# Patient Record
Sex: FEMALE | Race: WHITE | NOT HISPANIC OR LATINO | Employment: OTHER | URBAN - METROPOLITAN AREA
[De-identification: names, ages, dates, MRNs, and addresses within clinical notes are randomized per-mention and may not be internally consistent; named-entity substitution may affect disease eponyms.]

---

## 2017-02-03 RX ORDER — DILTIAZEM HYDROCHLORIDE 240 MG/1
CAPSULE, EXTENDED RELEASE ORAL
Qty: 30 CAPSULE | Refills: 4 | Status: SHIPPED | OUTPATIENT
Start: 2017-02-03 | End: 2017-05-01 | Stop reason: SDUPTHER

## 2017-04-11 ENCOUNTER — OFFICE VISIT (OUTPATIENT)
Dept: GASTROENTEROLOGY | Facility: CLINIC | Age: 72
End: 2017-04-11

## 2017-04-11 VITALS
SYSTOLIC BLOOD PRESSURE: 118 MMHG | HEIGHT: 66 IN | DIASTOLIC BLOOD PRESSURE: 72 MMHG | WEIGHT: 158.8 LBS | BODY MASS INDEX: 25.52 KG/M2

## 2017-04-11 DIAGNOSIS — Z83.71 FAMILY HISTORY OF POLYPS IN THE COLON: ICD-10-CM

## 2017-04-11 DIAGNOSIS — Z80.0 FAMILY HISTORY OF GI MALIGNANCY: ICD-10-CM

## 2017-04-11 DIAGNOSIS — K63.5 COLON POLYPS: ICD-10-CM

## 2017-04-11 DIAGNOSIS — K58.2 IRRITABLE BOWEL SYNDROME WITH BOTH CONSTIPATION AND DIARRHEA: ICD-10-CM

## 2017-04-11 DIAGNOSIS — K21.9 GASTROESOPHAGEAL REFLUX DISEASE, ESOPHAGITIS PRESENCE NOT SPECIFIED: ICD-10-CM

## 2017-04-11 DIAGNOSIS — R19.5 HEME POSITIVE STOOL: Primary | ICD-10-CM

## 2017-04-11 PROCEDURE — 99214 OFFICE O/P EST MOD 30 MIN: CPT | Performed by: INTERNAL MEDICINE

## 2017-04-11 RX ORDER — SODIUM CHLORIDE 0.9 % (FLUSH) 0.9 %
1-10 SYRINGE (ML) INJECTION AS NEEDED
Status: CANCELLED | OUTPATIENT
Start: 2017-04-11

## 2017-04-17 ENCOUNTER — TELEPHONE (OUTPATIENT)
Dept: GASTROENTEROLOGY | Facility: CLINIC | Age: 72
End: 2017-04-17

## 2017-04-17 NOTE — TELEPHONE ENCOUNTER
Call from pt.   She reports CT has been completed as ordered by Dr Geri Hensley.  Call to that office @ [phone] and spoke with Socorro.  Request CT results be faxed to [phone].

## 2017-04-17 NOTE — TELEPHONE ENCOUNTER
----- Message from Sam Downs sent at 4/17/2017  9:18 AM EDT -----  Regarding: PT CALLED  Contact: [phone]   PT IS CALLING TO GIVE RESULTS OF HER CT SCAN.

## 2017-04-24 ENCOUNTER — TELEPHONE (OUTPATIENT)
Dept: GASTROENTEROLOGY | Facility: CLINIC | Age: 72
End: 2017-04-24

## 2017-04-24 NOTE — TELEPHONE ENCOUNTER
With negative findings on CT scan, I would still offer both EGD and colonoscopy given her history of reflux, heme positive stools and suboptimal examination in the past.

## 2017-04-24 NOTE — TELEPHONE ENCOUNTER
----- Message from Angeli Cooley sent at 4/24/2017 10:05 AM EDT -----  Regarding: CT RESULTS  Contact: [phone]  PT CALLED FOR CT SCAN REPORT AND QUESTIONS ABOUT A SCOPE

## 2017-04-24 NOTE — TELEPHONE ENCOUNTER
Call to pt.  She is requesting CT results.  Advise per note that diverticulum seen, but no sign of diseas, and no action needs to be taken.  Otherwise, unremarkable.  Pt verb understanding and reports that has been placed on Bactrim for UTI.      Pt states that Dr Amezcua had scheduled egd/c/s, but wanted CT results prior to deciding to proceed.  State will be leaving for vacation 4/27, and not returning until just prior to scopes.  Asking if scopes still needed.  Message to Dr Amezcua.

## 2017-04-25 NOTE — TELEPHONE ENCOUNTER
Called pt and advised per Dr Amezcua that with negative findings on ct scan.  He still recommends egd and c/s given her history of reflux, heme positive stools and suboptimal examination in the past.  Pt verb understanding and states she will proceed with the scopes on 05/02/2017.

## 2017-04-26 ENCOUNTER — OFFICE VISIT (OUTPATIENT)
Dept: CARDIOLOGY | Facility: CLINIC | Age: 72
End: 2017-04-26

## 2017-04-26 VITALS
WEIGHT: 158 LBS | SYSTOLIC BLOOD PRESSURE: 124 MMHG | RESPIRATION RATE: 16 BRPM | HEIGHT: 66 IN | HEART RATE: 70 BPM | BODY MASS INDEX: 25.39 KG/M2 | DIASTOLIC BLOOD PRESSURE: 80 MMHG

## 2017-04-26 DIAGNOSIS — R07.2 PRECORDIAL PAIN: ICD-10-CM

## 2017-04-26 DIAGNOSIS — I48.0 PAROXYSMAL ATRIAL FIBRILLATION (HCC): Primary | ICD-10-CM

## 2017-04-26 PROCEDURE — 93000 ELECTROCARDIOGRAM COMPLETE: CPT | Performed by: INTERNAL MEDICINE

## 2017-04-26 PROCEDURE — 99214 OFFICE O/P EST MOD 30 MIN: CPT | Performed by: INTERNAL MEDICINE

## 2017-04-26 NOTE — PROGRESS NOTES
Date of Office Visit: 17  Encounter Provider: Deepak Connell MD  Place of Service: [facility] CARDIOLOGY  Patient Name: Rebeca De Souza  :1945      No chief complaint on file.    History of Present Illness  HPI Comments: The patient is a pleasant, 71-year-old female who has had an extensive cardiovascular  evaluation who had chest discomfort and multiple negative stress tests and then, in ,  had a cardiac catheterization at [facility] that was negative. She then presented  with three days of intermittent heart racing and chest pain and pressure. She presented  to [facility] on 2015, and was found to be in atrial fibrillation. She went back  into sinus rhythm on IV Cardizem and we started her on oral Cardizem. She had an  echocardiogram that showed normal left ventricular systolic function, normal saline  contrast study, and no significant valvular disease. She then wore a 30 day auto trigger atrial fibrillation event monitor.  She had no events.  She comes in for follow-up.   Denies near syncope or syncope.  Denies chest pain and pressure.  Denies orthopnea and PND.  Denies any stroke type symptoms.  She had one episode while her on vacation of some heart skipping but none of the racing like she had before.  Gets only one episode a year.  She had some dizziness around the time she had a urinary tract infection.  Overall she feels like heart wise she's doing great chest mother issues like she's can have an upper endoscopy and colonoscopy for GI reflux.  An indigestion.  She says things at home are good.    Atrial Fibrillation   Symptoms are negative for dizziness and weakness. Past medical history includes atrial fibrillation.         Past Medical History:   Diagnosis Date   • Atrial fibrillation     history on med   • Chest pain     history   • Fracture of radius     fall 16   • Frequent UTI     on prophylactic poab   • Hemorrhoid    •  Hypercalcemia    • IBS (irritable bowel syndrome)    • Parathyroid disorder     MD monitoring   • Wrist pain     left          Past Surgical History:   Procedure Laterality Date   • BLADDER SURGERY      tvt sling   • CARDIAC CATHETERIZATION     • COLONOSCOPY  10/23/2015    Non-thrombosed EH, Toruous colon, Diverticulosis, IH    • COLONOSCOPY  08/17/2010    Non-thrombosed EH, Diverticulosis, Tortuous colon, IH    • COLONOSCOPY  05/14/2007    Non thrombosed EH, Tortuous colon, One non bleeding polyp   • GALLBLADDER SURGERY     • HYSTERECTOMY      removed tubes & rt ovary also   • OOPHORECTOMY Left    • ORIF WRIST FRACTURE Left 12/30/2016    Procedure:  LT DISTAL RADIUS OPEN REDUCTION INTERNAL FIXATION;  Surgeon: Shawn Dorsey MD;  Location: [facility] OR;  Service:    • TONSILLECTOMY     • VARICOSE VEIN SURGERY      used wire         Current Outpatient Prescriptions on File Prior to Visit   Medication Sig Dispense Refill   • aspirin 81 MG chewable tablet Chew 81 mg daily.     • CARTIA  MG 24 hr capsule TAKE ONE CAPSULE BY MOUTH DAILY 30 capsule 4   • Cetirizine HCl 10 MG capsule Take 10 mg by mouth Daily.     • Cyanocobalamin (VITAMIN B-12) 500 MCG lozenge Take  by mouth.     • diltiaZEM CD (CARDIZEM CD) 240 MG 24 hr capsule Take 240 mg by mouth Every Morning.     • estradiol (VAGIFEM) 10 MCG tablet vaginal tablet Insert 1 tablet into the vagina 1 (One) Time Per Week.     • nitrofurantoin (MACRODANTIN) 50 MG capsule Take 50 mg by mouth Every Morning.     • [DISCONTINUED] mupirocin (BACTROBAN) 2 % nasal ointment 1 application into each nostril. USE AS DIRECTED PREOP     • [DISCONTINUED] ondansetron (ZOFRAN) 4 MG tablet Take 1 tablet by mouth Every 8 (Eight) Hours As Needed for nausea or vomiting. 30 tablet 0   • [DISCONTINUED] oxyCODONE-acetaminophen (PERCOCET) 5-325 MG per tablet Take 1-2 tablets by mouth Every 4 (Four) Hours As Needed (Pain). 56 tablet 0     No current facility-administered medications on file  prior to visit.          Social History     Social History   • Marital status:      Spouse name: N/A   • Number of children: N/A   • Years of education: N/A     Occupational History   • Not on file.     Social History Main Topics   • Smoking status: Never Smoker   • Smokeless tobacco: Never Used   • Alcohol use Yes      Comment: rarely   • Drug use: No      Comment: prescribed by MD   • Sexual activity: Defer     Other Topics Concern   • Not on file     Social History Narrative       Family History   Problem Relation Age of Onset   • Hypertension Mother    • Heart attack Father    • Hypertension Father    • Stroke Paternal Grandmother    • Pancreatic cancer Brother    • Colon cancer Maternal Uncle          Review of Systems   Constitution: Negative for decreased appetite, diaphoresis, fever, weakness, malaise/fatigue, weight gain and weight loss.   HENT: Negative for congestion, headaches, hearing loss, nosebleeds and tinnitus.    Eyes: Negative for blurred vision, double vision, vision loss in left eye, vision loss in right eye and visual disturbance.   Cardiovascular:        As noted in HPI   Respiratory:        As noted HPI   Endocrine: Negative for cold intolerance and heat intolerance.   Hematologic/Lymphatic: Negative for bleeding problem. Does not bruise/bleed easily.   Skin: Negative for color change, flushing, itching and rash.   Musculoskeletal: Negative for arthritis, back pain, joint pain, joint swelling, muscle weakness and myalgias.   Gastrointestinal: Positive for heartburn. Negative for bloating, abdominal pain, constipation, diarrhea, dysphagia, hematemesis, hematochezia, melena, nausea and vomiting.   Genitourinary: Negative for bladder incontinence, dysuria, frequency, nocturia and urgency.   Neurological: Negative for dizziness, focal weakness, light-headedness, loss of balance, numbness, paresthesias and vertigo.   Psychiatric/Behavioral: Negative for depression, memory loss and substance  "abuse.       Procedures      ECG 12 Lead  Date/Time: 4/26/2017 2:55 PM  Performed by: JAGRUTI MINA  Authorized by: JAGRUTI MINA   Comparison: compared with previous ECG   Similar to previous ECG  Rhythm: sinus rhythm  Rate: normal  Conduction: 1st degree  QRS axis: normal                 Objective:    /80 (BP Location: Right arm, Patient Position: Sitting, Cuff Size: Adult)  Pulse 70  Resp 16  Ht 66\" (167.6 cm)  Wt 158 lb (71.7 kg)  BMI 25.5 kg/m2       Physical Exam  Physical Exam   Constitutional: She is oriented to person, place, and time. She appears well-developed and well-nourished. No distress.   HENT:   Head: Normocephalic.   Eyes: Conjunctivae are normal. Pupils are equal, round, and reactive to light. No scleral icterus.   Neck: Normal carotid pulses, no hepatojugular reflux and no JVD present. Carotid bruit is not present. No tracheal deviation, no edema and no erythema present. No thyromegaly present.   Cardiovascular: Normal rate, regular rhythm, S1 normal, S2 normal, normal heart sounds and intact distal pulses.   No extrasystoles are present. PMI is not displaced.  Exam reveals no gallop, no distant heart sounds and no friction rub.    No murmur heard.  Pulses:       Carotid pulses are 2+ on the right side, and 2+ on the left side.       Radial pulses are 2+ on the right side, and 2+ on the left side.        Femoral pulses are 2+ on the right side, and 2+ on the left side.       Dorsalis pedis pulses are 2+ on the right side, and 2+ on the left side.        Posterior tibial pulses are 2+ on the right side, and 2+ on the left side.   Pulmonary/Chest: Effort normal and breath sounds normal. No respiratory distress. She has no decreased breath sounds. She has no wheezes. She has no rhonchi. She has no rales. She exhibits no tenderness.   Abdominal: Soft. Bowel sounds are normal. She exhibits no distension and no mass. There is no hepatosplenomegaly. There is no tenderness. There is " no rebound and no guarding.   Musculoskeletal: She exhibits no edema, tenderness or deformity.   Neurological: She is alert and oriented to person, place, and time.   Skin: Skin is warm and dry. No rash noted. She is not diaphoretic. No cyanosis or erythema. No pallor. Nails show no clubbing.   Psychiatric: She has a normal mood and affect. Her speech is normal and behavior is normal. Judgment and thought content normal.           Assessment:   1. This is a 71-year-old female with paroxysmal atrial fibrillation, [id number] score of two, which puts her at high risk of embolic event. Normal 30 day atrial fibrillation event monitor taken off anticoagulation.  Atrial Fibrillation and Atrial Flutter  Assessment  • The patient has paroxysmal atrial fibrillation  • This is non-valvular in etiology  • The patient's CHADS2-VASc score is 2  • A [id number]-LQTx score of 2 or more is considered a high risk for a thromboembolic event  • Aspirin prescribed    Plan  • Attempt to maintain sinus rhythm  • Continue aspirin for antithrombotic therapy, bleeding issues discussed  • Continue diltiazem for rhythm control  She's now doing well she is to continue the same will see us again in follow-up in one year.    2. History of chest pain. Negative cardiovascular workup.  In retrospect this is probably related to her arrhythmia.         Plan:

## 2017-05-02 ENCOUNTER — ANESTHESIA EVENT (OUTPATIENT)
Dept: GASTROENTEROLOGY | Facility: HOSPITAL | Age: 72
End: 2017-05-02

## 2017-05-02 ENCOUNTER — HOSPITAL ENCOUNTER (OUTPATIENT)
Facility: HOSPITAL | Age: 72
Setting detail: HOSPITAL OUTPATIENT SURGERY
Discharge: HOME | End: 2017-05-02
Attending: INTERNAL MEDICINE | Admitting: INTERNAL MEDICINE

## 2017-05-02 ENCOUNTER — ANESTHESIA (OUTPATIENT)
Dept: GASTROENTEROLOGY | Facility: HOSPITAL | Age: 72
End: 2017-05-02

## 2017-05-02 VITALS
RESPIRATION RATE: 20 BRPM | OXYGEN SATURATION: 98 % | HEART RATE: 69 BPM | SYSTOLIC BLOOD PRESSURE: 122 MMHG | TEMPERATURE: 98.3 F | DIASTOLIC BLOOD PRESSURE: 68 MMHG

## 2017-05-02 DIAGNOSIS — R19.5 HEME POSITIVE STOOL: ICD-10-CM

## 2017-05-02 DIAGNOSIS — Z83.71 FAMILY HISTORY OF POLYPS IN THE COLON: ICD-10-CM

## 2017-05-02 DIAGNOSIS — K21.9 GASTROESOPHAGEAL REFLUX DISEASE, ESOPHAGITIS PRESENCE NOT SPECIFIED: ICD-10-CM

## 2017-05-02 DIAGNOSIS — K63.5 COLON POLYPS: ICD-10-CM

## 2017-05-02 DIAGNOSIS — K58.2 IRRITABLE BOWEL SYNDROME WITH BOTH CONSTIPATION AND DIARRHEA: ICD-10-CM

## 2017-05-02 DIAGNOSIS — Z80.0 FAMILY HISTORY OF GI MALIGNANCY: ICD-10-CM

## 2017-05-02 PROCEDURE — 87081 CULTURE SCREEN ONLY: CPT | Performed by: INTERNAL MEDICINE

## 2017-05-02 PROCEDURE — 88312 SPECIAL STAINS GROUP 1: CPT | Performed by: INTERNAL MEDICINE

## 2017-05-02 PROCEDURE — 25010000002 PROPOFOL 10 MG/ML EMULSION: Performed by: ANESTHESIOLOGY

## 2017-05-02 PROCEDURE — 88305 TISSUE EXAM BY PATHOLOGIST: CPT | Performed by: INTERNAL MEDICINE

## 2017-05-02 PROCEDURE — 45380 COLONOSCOPY AND BIOPSY: CPT | Performed by: INTERNAL MEDICINE

## 2017-05-02 PROCEDURE — 43239 EGD BIOPSY SINGLE/MULTIPLE: CPT | Performed by: INTERNAL MEDICINE

## 2017-05-02 RX ORDER — SODIUM CHLORIDE, SODIUM LACTATE, POTASSIUM CHLORIDE, AND CALCIUM CHLORIDE .6; .31; .03; .02 G/100ML; G/100ML; G/100ML; G/100ML
20 INJECTION, SOLUTION INTRAVENOUS CONTINUOUS
Status: DISCONTINUED | OUTPATIENT
Start: 2017-05-02 | End: 2017-05-02 | Stop reason: HOSPADM

## 2017-05-02 RX ORDER — LIDOCAINE HYDROCHLORIDE 20 MG/ML
INJECTION, SOLUTION INFILTRATION; PERINEURAL AS NEEDED
Status: DISCONTINUED | OUTPATIENT
Start: 2017-05-02 | End: 2017-05-02 | Stop reason: SURG

## 2017-05-02 RX ORDER — SODIUM CHLORIDE 0.9 % (FLUSH) 0.9 %
1-10 SYRINGE (ML) INJECTION AS NEEDED
Status: DISCONTINUED | OUTPATIENT
Start: 2017-05-02 | End: 2017-05-02 | Stop reason: HOSPADM

## 2017-05-02 RX ORDER — PROPOFOL 10 MG/ML
VIAL (ML) INTRAVENOUS AS NEEDED
Status: DISCONTINUED | OUTPATIENT
Start: 2017-05-02 | End: 2017-05-02 | Stop reason: SURG

## 2017-05-02 RX ORDER — PROPOFOL 10 MG/ML
VIAL (ML) INTRAVENOUS CONTINUOUS PRN
Status: DISCONTINUED | OUTPATIENT
Start: 2017-05-02 | End: 2017-05-02 | Stop reason: SURG

## 2017-05-02 RX ADMIN — SODIUM CHLORIDE, SODIUM LACTATE, POTASSIUM CHLORIDE, AND CALCIUM CHLORIDE: 600; 310; 30; 20 INJECTION, SOLUTION INTRAVENOUS at 13:35

## 2017-05-02 RX ADMIN — PROPOFOL 300 MCG/KG/MIN: 10 INJECTION, EMULSION INTRAVENOUS at 13:40

## 2017-05-02 RX ADMIN — SODIUM CHLORIDE, SODIUM LACTATE, POTASSIUM CHLORIDE, AND CALCIUM CHLORIDE 700 ML: 600; 310; 30; 20 INJECTION, SOLUTION INTRAVENOUS at 14:21

## 2017-05-02 RX ADMIN — EPHEDRINE SULFATE 5 MG: 50 INJECTION INTRAMUSCULAR; INTRAVENOUS; SUBCUTANEOUS at 14:01

## 2017-05-02 RX ADMIN — PROPOFOL 100 MG: 10 INJECTION, EMULSION INTRAVENOUS at 13:40

## 2017-05-02 RX ADMIN — SODIUM CHLORIDE, SODIUM LACTATE, POTASSIUM CHLORIDE, AND CALCIUM CHLORIDE 20 ML/HR: 600; 310; 30; 20 INJECTION, SOLUTION INTRAVENOUS at 13:03

## 2017-05-02 RX ADMIN — LIDOCAINE HYDROCHLORIDE 40 MG: 20 INJECTION, SOLUTION INFILTRATION; PERINEURAL at 13:40

## 2017-05-03 LAB
CYTO UR: NORMAL
LAB AP CASE REPORT: NORMAL
Lab: NORMAL
PATH REPORT.FINAL DX SPEC: NORMAL
PATH REPORT.GROSS SPEC: NORMAL
UREASE TISS QL: NEGATIVE

## 2017-05-09 ENCOUNTER — TELEPHONE (OUTPATIENT)
Dept: GASTROENTEROLOGY | Facility: CLINIC | Age: 72
End: 2017-05-09

## 2017-05-09 RX ORDER — ESOMEPRAZOLE MAGNESIUM 40 MG/1
40 CAPSULE, DELAYED RELEASE ORAL DAILY
Qty: 30 CAPSULE | Refills: 11 | Status: SHIPPED | OUTPATIENT
Start: 2017-05-09 | End: 2021-10-26 | Stop reason: ALTCHOICE

## 2017-06-30 RX ORDER — DILTIAZEM HYDROCHLORIDE 240 MG/1
CAPSULE, EXTENDED RELEASE ORAL
Qty: 30 CAPSULE | Refills: 5 | Status: SHIPPED | OUTPATIENT
Start: 2017-06-30 | End: 2017-12-31 | Stop reason: SDUPTHER

## 2017-08-16 ENCOUNTER — TELEPHONE (OUTPATIENT)
Dept: GASTROENTEROLOGY | Facility: CLINIC | Age: 72
End: 2017-08-16

## 2017-08-16 NOTE — TELEPHONE ENCOUNTER
"Call from pt.  [facility] went to [facility] to  Esomeprazole and was advised that could not fill because of adjudication reject.  [facility] has received in prior months without problem.    Call to [facility] @ [phone] and spoke with Pharmacist, Jorge.  He states that \"plan limitations exceeded\".  Will require PA.  Has been faxed.  Will refax to [phone].  "

## 2017-08-17 ENCOUNTER — DOCUMENTATION (OUTPATIENT)
Dept: GASTROENTEROLOGY | Facility: CLINIC | Age: 72
End: 2017-08-17

## 2017-08-21 NOTE — TELEPHONE ENCOUNTER
PA approval for Nexium received for 6/18/16 - 8/17/18 (see Media Tab).      Call to pt to advise of above.  Verb understanding.

## 2018-01-02 RX ORDER — DILTIAZEM HYDROCHLORIDE 240 MG/1
CAPSULE, EXTENDED RELEASE ORAL
Qty: 30 CAPSULE | Refills: 4 | Status: SHIPPED | OUTPATIENT
Start: 2018-01-02 | End: 2018-04-26 | Stop reason: SDUPTHER

## 2018-04-26 ENCOUNTER — OFFICE VISIT (OUTPATIENT)
Dept: CARDIOLOGY | Facility: CLINIC | Age: 73
End: 2018-04-26

## 2018-04-26 VITALS
HEART RATE: 62 BPM | WEIGHT: 155 LBS | DIASTOLIC BLOOD PRESSURE: 78 MMHG | HEIGHT: 67 IN | BODY MASS INDEX: 24.33 KG/M2 | SYSTOLIC BLOOD PRESSURE: 128 MMHG

## 2018-04-26 DIAGNOSIS — I48.0 PAROXYSMAL ATRIAL FIBRILLATION (HCC): Primary | ICD-10-CM

## 2018-04-26 DIAGNOSIS — E78.2 MIXED HYPERLIPIDEMIA: ICD-10-CM

## 2018-04-26 PROCEDURE — 93000 ELECTROCARDIOGRAM COMPLETE: CPT | Performed by: INTERNAL MEDICINE

## 2018-04-26 PROCEDURE — 99214 OFFICE O/P EST MOD 30 MIN: CPT | Performed by: INTERNAL MEDICINE

## 2018-04-26 RX ORDER — DILTIAZEM HYDROCHLORIDE 240 MG/1
240 CAPSULE, COATED, EXTENDED RELEASE ORAL DAILY
Qty: 30 CAPSULE | Refills: 11 | Status: SHIPPED | OUTPATIENT
Start: 2018-04-26 | End: 2018-05-08

## 2018-04-26 NOTE — PROGRESS NOTES
Date of Office Visit: 18  Encounter Provider: Deepak Connell MD  Place of Service: [facility] CARDIOLOGY  Patient Name: Rebeca De Souza  :1945  Referral Provider:Geri Hensley MD      Chief Complaint   Patient presents with   • Atrial Fibrillation     History of Present Illness  The patient is a pleasant, 2-year-old female who has had an extensive cardiovascular  evaluation who had chest discomfort and multiple negative stress tests and then, in ,  had a cardiac catheterization at [facility] that was negative. She then presented  with three days of intermittent heart racing and chest pain and pressure. She presented  to [facility] on 2015, and was found to be in atrial fibrillation. She went back  into sinus rhythm on IV Cardizem and we started her on oral Cardizem. She had an  echocardiogram that showed normal left ventricular systolic function, normal saline  contrast study, and no significant valvular disease. She then wore a 30 day auto trigger atrial fibrillation event monitor.  She had no events.  She comes in for follow-up. The patient denies chest pain, pressure and heaviness. No shortness of breath, othopnea or PND.  She gets rare palpitations once or twice a month lasting just a few seconds without associated symptoms, no near syncope or syncope. No stroke type symptoms like paralysis, paresthesia, abrupt vision loss and dysarthria. No bleeding like blood in the stool or dark stools.   She had plantar fasciitis over the winter hadn't been walking but that's improving she was walking 2 miles a day.  Things at home are good.      Atrial Fibrillation   Symptoms are negative for dizziness and weakness. Past medical history includes atrial fibrillation.         Past Medical History:   Diagnosis Date   • Atrial fibrillation     history on med   • Blood in stool    • Chest pain     history   • Fracture of radius     fall 16   • Frequent  UTI     on prophylactic poab   • GERD (gastroesophageal reflux disease)    • Hemorrhoid    • Hypercalcemia    • IBS (irritable bowel syndrome)    • Parathyroid disorder     MD monitoring   • Wrist pain     left          Past Surgical History:   Procedure Laterality Date   • BLADDER SURGERY      tvt sling   • CARDIAC CATHETERIZATION     • COLONOSCOPY  10/23/2015    Non-thrombosed EH, Toruous colon, Diverticulosis, IH    • COLONOSCOPY  08/17/2010    Non-thrombosed EH, Diverticulosis, Tortuous colon, IH    • COLONOSCOPY  05/14/2007    Non thrombosed EH, Tortuous colon, One non bleeding polyp   • COLONOSCOPY N/A 5/2/2017    Procedure: COLONOSCOPY TO CECUM AND TI WITH COLD BIOPSIES POLYPECTOMY;  Surgeon: Deepak MARTINES MD;  Location: [facility] ENDOSCOPY;  Service:    • ENDOSCOPY N/A 5/2/2017    Procedure: ESOPHAGOGASTRODUODENOSCOPY  WITH COLD BIOPSY;  Surgeon: Deepak MARTINES MD;  Location: [facility] ENDOSCOPY;  Service:    • GALLBLADDER SURGERY     • HYSTERECTOMY      removed tubes & rt ovary also   • OOPHORECTOMY Left    • ORIF WRIST FRACTURE Left 12/30/2016    Procedure:  LT DISTAL RADIUS OPEN REDUCTION INTERNAL FIXATION;  Surgeon: Shawn Dorsey MD;  Location: [facility] MAIN OR;  Service:    • TONSILLECTOMY     • TONSILLECTOMY     • VARICOSE VEIN SURGERY      used wire         Current Outpatient Prescriptions on File Prior to Visit   Medication Sig Dispense Refill   • aspirin 81 MG chewable tablet Chew 81 mg daily.     • Cetirizine HCl 10 MG capsule Take 10 mg by mouth Daily.     • esomeprazole (nexIUM) 40 MG capsule Take 1 capsule by mouth Daily. 30 capsule 11   • estradiol (VAGIFEM) 10 MCG tablet vaginal tablet Insert 1 tablet into the vagina 1 (One) Time Per Week.     • nitrofurantoin (MACRODANTIN) 50 MG capsule Take 50 mg by mouth Every Morning.     • [DISCONTINUED] CARTIA  MG 24 hr capsule TAKE ONE CAPSULE BY MOUTH DAILY 30 capsule 4   • [DISCONTINUED] diltiaZEM CD (CARDIZEM CD) 240 MG 24 hr capsule Take  240 mg by mouth Every Morning.       No current facility-administered medications on file prior to visit.          Social History     Social History   • Marital status:      Spouse name: N/A   • Number of children: N/A   • Years of education: N/A     Occupational History   • Not on file.     Social History Main Topics   • Smoking status: Never Smoker   • Smokeless tobacco: Never Used   • Alcohol use Yes      Comment: rarely   • Drug use: No      Comment: prescribed by MD   • Sexual activity: Defer     Other Topics Concern   • Not on file     Social History Narrative   • No narrative on file       Family History   Problem Relation Age of Onset   • Hypertension Mother    • Heart attack Father    • Hypertension Father    • Stroke Paternal Grandmother    • Pancreatic cancer Brother    • Colon cancer Maternal Uncle          Review of Systems   Constitution: Negative for decreased appetite, diaphoresis, fever, weakness, malaise/fatigue, weight gain and weight loss.   HENT: Negative for congestion, hearing loss, nosebleeds and tinnitus.    Eyes: Negative for blurred vision, double vision, vision loss in left eye, vision loss in right eye and visual disturbance.   Cardiovascular:        As noted in HPI   Respiratory:        As noted HPI   Endocrine: Negative for cold intolerance and heat intolerance.   Hematologic/Lymphatic: Negative for bleeding problem. Does not bruise/bleed easily.   Skin: Negative for color change, flushing, itching and rash.   Musculoskeletal: Negative for arthritis, back pain, joint pain, joint swelling, muscle weakness and myalgias.   Gastrointestinal: Negative for bloating, abdominal pain, constipation, diarrhea, dysphagia, heartburn, hematemesis, hematochezia, melena, nausea and vomiting.   Genitourinary: Negative for bladder incontinence, dysuria, frequency, nocturia and urgency.   Neurological: Negative for dizziness, focal weakness, headaches, light-headedness, loss of balance, numbness,  "paresthesias and vertigo.   Psychiatric/Behavioral: Negative for depression, memory loss and substance abuse.       Procedures      ECG 12 Lead  Date/Time: 4/26/2018 2:11 PM  Performed by: JAGRUTI MINA  Authorized by: JAGRUTI MINA   Comparison: compared with previous ECG   Similar to previous ECG  Rhythm: sinus rhythm  Rate: normal  QRS axis: normal                  Objective:    /78 (BP Location: Right arm)   Pulse 62   Ht 168.9 cm (66.5\")   Wt 70.3 kg (155 lb)   BMI 24.64 kg/m²        Physical Exam  Physical Exam   Constitutional: She is oriented to person, place, and time. She appears well-developed and well-nourished. No distress.   HENT:   Head: Normocephalic.   Eyes: Conjunctivae are normal. Pupils are equal, round, and reactive to light. No scleral icterus.   Neck: Normal carotid pulses, no hepatojugular reflux and no JVD present. Carotid bruit is not present. No tracheal deviation, no edema and no erythema present. No thyromegaly present.   Cardiovascular: Normal rate, regular rhythm, S1 normal, S2 normal, normal heart sounds and intact distal pulses.   No extrasystoles are present. PMI is not displaced.  Exam reveals no gallop, no distant heart sounds and no friction rub.    No murmur heard.  Pulses:       Carotid pulses are 2+ on the right side, and 2+ on the left side.       Radial pulses are 2+ on the right side, and 2+ on the left side.        Femoral pulses are 2+ on the right side, and 2+ on the left side.       Dorsalis pedis pulses are 2+ on the right side, and 2+ on the left side.        Posterior tibial pulses are 2+ on the right side, and 2+ on the left side.   Pulmonary/Chest: Effort normal and breath sounds normal. No respiratory distress. She has no decreased breath sounds. She has no wheezes. She has no rhonchi. She has no rales. She exhibits no tenderness.   Abdominal: Soft. Bowel sounds are normal. She exhibits no distension and no mass. There is no hepatosplenomegaly. " There is no tenderness. There is no rebound and no guarding.   Musculoskeletal: She exhibits no edema, tenderness or deformity.   Neurological: She is alert and oriented to person, place, and time.   Skin: Skin is warm and dry. No rash noted. She is not diaphoretic. No cyanosis or erythema. No pallor. Nails show no clubbing.   Psychiatric: She has a normal mood and affect. Her speech is normal and behavior is normal. Judgment and thought content normal.           Assessment:   1. This is a 71-year-old female with paroxysmal atrial fibrillation, [id number] score of two, which puts her at high risk of embolic event. Normal 30 day atrial fibrillation event monitor taken off anticoagulation.  Atrial Fibrillation and Atrial Flutter  Assessment  • The patient has paroxysmal atrial fibrillation  • This is non-valvular in etiology  • The patient's CHADS2-VASc score is 2  • A [id number] score of 2 or more is considered a high risk for a thromboembolic event  • Aspirin prescribed    Plan  • Attempt to maintain sinus rhythm  • Continue aspirin for antithrombotic therapy, bleeding issues discussed  • Continue diltiazem for rhythm control  She's now doing well she is to continue the same will see us again in follow-up in one year.     2. History of chest pain. Negative cardiovascular workup.  In retrospect this is probably related to her arrhythmia.   3. Cardiac vascular risk assessment.  Based on her lipids her [employer] risk of stroke or heart attack in the next 10 years is 9.1% even at her age she would benefit from intermediate dose statin therapy.  Follow up with her PCP.         Plan:

## 2018-05-08 ENCOUNTER — HOSPITAL ENCOUNTER (EMERGENCY)
Facility: HOSPITAL | Age: 73
Discharge: HOME | End: 2018-05-08
Attending: EMERGENCY MEDICINE | Admitting: EMERGENCY MEDICINE

## 2018-05-08 ENCOUNTER — APPOINTMENT (OUTPATIENT)
Dept: GENERAL RADIOLOGY | Facility: HOSPITAL | Age: 73
End: 2018-05-08

## 2018-05-08 VITALS
HEART RATE: 62 BPM | TEMPERATURE: 96.5 F | SYSTOLIC BLOOD PRESSURE: 148 MMHG | WEIGHT: 151 LBS | RESPIRATION RATE: 18 BRPM | BODY MASS INDEX: 23.7 KG/M2 | DIASTOLIC BLOOD PRESSURE: 74 MMHG | HEIGHT: 67 IN | OXYGEN SATURATION: 98 %

## 2018-05-08 DIAGNOSIS — I48.0 PAROXYSMAL ATRIAL FIBRILLATION (HCC): Primary | ICD-10-CM

## 2018-05-08 LAB
ALBUMIN SERPL-MCNC: 4.5 G/DL (ref 3.5–5.2)
ALBUMIN/GLOB SERPL: 1.3 G/DL
ALP SERPL-CCNC: 114 U/L (ref 39–117)
ALT SERPL W P-5'-P-CCNC: 21 U/L (ref 1–33)
ANION GAP SERPL CALCULATED.3IONS-SCNC: 15.4 MMOL/L
AST SERPL-CCNC: 18 U/L (ref 1–32)
BASOPHILS # BLD AUTO: 0.07 10*3/MM3 (ref 0–0.2)
BASOPHILS NFR BLD AUTO: 1.1 % (ref 0–1.5)
BILIRUB SERPL-MCNC: 0.3 MG/DL (ref 0.1–1.2)
BUN BLD-MCNC: 17 MG/DL (ref 8–23)
BUN/CREAT SERPL: 21.8 (ref 7–25)
CALCIUM SPEC-SCNC: 9.8 MG/DL (ref 8.6–10.5)
CHLORIDE SERPL-SCNC: 104 MMOL/L (ref 98–107)
CO2 SERPL-SCNC: 24.6 MMOL/L (ref 22–29)
CREAT BLD-MCNC: 0.78 MG/DL (ref 0.57–1)
DEPRECATED RDW RBC AUTO: 45.4 FL (ref 37–54)
EOSINOPHIL # BLD AUTO: 0.25 10*3/MM3 (ref 0–0.7)
EOSINOPHIL NFR BLD AUTO: 3.9 % (ref 0.3–6.2)
ERYTHROCYTE [DISTWIDTH] IN BLOOD BY AUTOMATED COUNT: 12.5 % (ref 11.7–13)
GFR SERPL CREATININE-BSD FRML MDRD: 73 ML/MIN/1.73
GLOBULIN UR ELPH-MCNC: 3.4 GM/DL
GLUCOSE BLD-MCNC: 125 MG/DL (ref 65–99)
HCT VFR BLD AUTO: 43.1 % (ref 35.6–45.5)
HGB BLD-MCNC: 14.1 G/DL (ref 11.9–15.5)
IMM GRANULOCYTES # BLD: 0 10*3/MM3 (ref 0–0.03)
IMM GRANULOCYTES NFR BLD: 0 % (ref 0–0.5)
INR PPP: 0.96 (ref 0.9–1.1)
LYMPHOCYTES # BLD AUTO: 1.78 10*3/MM3 (ref 0.9–4.8)
LYMPHOCYTES NFR BLD AUTO: 27.5 % (ref 19.6–45.3)
MCH RBC QN AUTO: 32.3 PG (ref 26.9–32)
MCHC RBC AUTO-ENTMCNC: 32.7 G/DL (ref 32.4–36.3)
MCV RBC AUTO: 98.9 FL (ref 80.5–98.2)
MONOCYTES # BLD AUTO: 0.66 10*3/MM3 (ref 0.2–1.2)
MONOCYTES NFR BLD AUTO: 10.2 % (ref 5–12)
NEUTROPHILS # BLD AUTO: 3.71 10*3/MM3 (ref 1.9–8.1)
NEUTROPHILS NFR BLD AUTO: 57.3 % (ref 42.7–76)
PLATELET # BLD AUTO: 220 10*3/MM3 (ref 140–500)
PMV BLD AUTO: 10.9 FL (ref 6–12)
POTASSIUM BLD-SCNC: 3.5 MMOL/L (ref 3.5–5.2)
PROT SERPL-MCNC: 7.9 G/DL (ref 6–8.5)
PROTHROMBIN TIME: 12.6 SECONDS (ref 11.7–14.2)
RBC # BLD AUTO: 4.36 10*6/MM3 (ref 3.9–5.2)
SODIUM BLD-SCNC: 144 MMOL/L (ref 136–145)
TROPONIN T SERPL-MCNC: <0.01 NG/ML (ref 0–0.03)
WBC NRBC COR # BLD: 6.47 10*3/MM3 (ref 4.5–10.7)

## 2018-05-08 PROCEDURE — 85025 COMPLETE CBC W/AUTO DIFF WBC: CPT | Performed by: EMERGENCY MEDICINE

## 2018-05-08 PROCEDURE — 93010 ELECTROCARDIOGRAM REPORT: CPT | Performed by: INTERNAL MEDICINE

## 2018-05-08 PROCEDURE — 93005 ELECTROCARDIOGRAM TRACING: CPT | Performed by: EMERGENCY MEDICINE

## 2018-05-08 PROCEDURE — 96375 TX/PRO/DX INJ NEW DRUG ADDON: CPT

## 2018-05-08 PROCEDURE — 71045 X-RAY EXAM CHEST 1 VIEW: CPT

## 2018-05-08 PROCEDURE — 84484 ASSAY OF TROPONIN QUANT: CPT | Performed by: EMERGENCY MEDICINE

## 2018-05-08 PROCEDURE — 80053 COMPREHEN METABOLIC PANEL: CPT | Performed by: EMERGENCY MEDICINE

## 2018-05-08 PROCEDURE — 96374 THER/PROPH/DIAG INJ IV PUSH: CPT

## 2018-05-08 PROCEDURE — 99285 EMERGENCY DEPT VISIT HI MDM: CPT

## 2018-05-08 PROCEDURE — 85610 PROTHROMBIN TIME: CPT | Performed by: EMERGENCY MEDICINE

## 2018-05-08 PROCEDURE — 25010000002 ADENOSINE PER 6 MG: Performed by: EMERGENCY MEDICINE

## 2018-05-08 RX ORDER — SODIUM CHLORIDE 0.9 % (FLUSH) 0.9 %
10 SYRINGE (ML) INJECTION AS NEEDED
Status: DISCONTINUED | OUTPATIENT
Start: 2018-05-08 | End: 2018-05-08 | Stop reason: HOSPADM

## 2018-05-08 RX ORDER — ADENOSINE 3 MG/ML
6 INJECTION, SOLUTION INTRAVENOUS ONCE
Status: COMPLETED | OUTPATIENT
Start: 2018-05-08 | End: 2018-05-08

## 2018-05-08 RX ORDER — DILTIAZEM HYDROCHLORIDE 360 MG/1
360 CAPSULE, EXTENDED RELEASE ORAL DAILY
Qty: 30 CAPSULE | Refills: 0 | Status: SHIPPED | OUTPATIENT
Start: 2018-05-08 | End: 2018-05-14 | Stop reason: SDUPTHER

## 2018-05-08 RX ADMIN — APIXABAN 5 MG: 5 TABLET, FILM COATED ORAL at 07:50

## 2018-05-08 RX ADMIN — ADENOSINE 6 MG: 3 INJECTION, SOLUTION INTRAVENOUS at 06:19

## 2018-05-08 RX ADMIN — METOPROLOL TARTRATE 5 MG: 5 INJECTION, SOLUTION INTRAVENOUS at 06:23

## 2018-05-08 NOTE — ED TRIAGE NOTES
"Pt reports dizziness, lightheaded and weakness x 3 days. Pt also states \"my heart just started jumping around a little bit ago\". Pt hx afib.   "

## 2018-05-08 NOTE — ED PROVIDER NOTES
" EMERGENCY DEPARTMENT ENCOUNTER    CHIEF COMPLAINT  Chief Complaint: Palpitations   History given by: patient   History limited by: n/a  Room Number: 15/15  PMD: MD Dr Becki Dominguez, cardiology    HPI:  Pt is a 72 y.o. female who presents complaining of palpitations that began this morning. Pt describes the palpitations as her \"heart jumping around\". Pt reports a hx of atrial fibrillation. Pt also complains of dizziness that has been ongoing for the past 3 days and upper back pain.     Duration:  Prior to arrival  Onset: sudden  Timing: constant   Location: cardiac   Radiation: none   Quality: \"heart jumping around\"  Intensity/Severity: moderate  Progression: unchanged   Associated Symptoms: dizziness x3 days, u[pper back pain  Aggravating Factors: none  Alleviating Factors: none  Previous Episodes: hx of a-fib  Treatment before arrival: none    PAST MEDICAL HISTORY  Active Ambulatory Problems     Diagnosis Date Noted   • No Active Ambulatory Problems     Resolved Ambulatory Problems     Diagnosis Date Noted   • No Resolved Ambulatory Problems     Past Medical History:   Diagnosis Date   • Atrial fibrillation    • Blood in stool    • Chest pain    • Fracture of radius    • Frequent UTI    • GERD (gastroesophageal reflux disease)    • Hemorrhoid    • Hypercalcemia    • IBS (irritable bowel syndrome)    • Parathyroid disorder    • Wrist pain        PAST SURGICAL HISTORY  Past Surgical History:   Procedure Laterality Date   • BLADDER SURGERY      tvt sling   • CARDIAC CATHETERIZATION     • COLONOSCOPY  10/23/2015    Non-thrombosed EH, Toruous colon, Diverticulosis, IH    • COLONOSCOPY  08/17/2010    Non-thrombosed EH, Diverticulosis, Tortuous colon, IH    • COLONOSCOPY  05/14/2007    Non thrombosed EH, Tortuous colon, One non bleeding polyp   • COLONOSCOPY N/A 5/2/2017    Procedure: COLONOSCOPY TO CECUM AND TI WITH COLD BIOPSIES POLYPECTOMY;  Surgeon: Deepak MARTINES MD;  Location: [facility] ENDOSCOPY;  " Service:    • ENDOSCOPY N/A 5/2/2017    Procedure: ESOPHAGOGASTRODUODENOSCOPY  WITH COLD BIOPSY;  Surgeon: Deepak MARTINES MD;  Location: [facility] ENDOSCOPY;  Service:    • GALLBLADDER SURGERY     • HYSTERECTOMY      removed tubes & rt ovary also   • OOPHORECTOMY Left    • ORIF WRIST FRACTURE Left 12/30/2016    Procedure:  LT DISTAL RADIUS OPEN REDUCTION INTERNAL FIXATION;  Surgeon: Shawn Dorsey MD;  Location: [facility] MAIN OR;  Service:    • TONSILLECTOMY     • TONSILLECTOMY     • VARICOSE VEIN SURGERY      used wire       FAMILY HISTORY  Family History   Problem Relation Age of Onset   • Hypertension Mother    • Heart attack Father    • Hypertension Father    • Stroke Paternal Grandmother    • Pancreatic cancer Brother    • Colon cancer Maternal Uncle        SOCIAL HISTORY  Social History     Social History   • Marital status:      Spouse name: N/A   • Number of children: N/A   • Years of education: N/A     Occupational History   • Not on file.     Social History Main Topics   • Smoking status: Never Smoker   • Smokeless tobacco: Never Used   • Alcohol use Yes      Comment: rarely   • Drug use: No      Comment: prescribed by MD   • Sexual activity: Defer     Other Topics Concern   • Not on file     Social History Narrative   • No narrative on file       ALLERGIES  Shellfish-derived products; Shrimp (diagnostic); Other; and Penicillins    REVIEW OF SYSTEMS  Review of Systems   Constitutional: Negative for fever.   HENT: Negative for sore throat.    Eyes: Negative.    Respiratory: Negative for cough and shortness of breath.    Cardiovascular: Positive for palpitations. Negative for chest pain.   Gastrointestinal: Negative for abdominal pain, diarrhea and vomiting.   Genitourinary: Negative for dysuria.   Musculoskeletal: Positive for back pain. Negative for neck pain.   Skin: Negative for rash.   Allergic/Immunologic: Negative.    Neurological: Positive for dizziness. Negative for weakness, numbness and  headaches.   Hematological: Negative.    Psychiatric/Behavioral: Negative.    All other systems reviewed and are negative.      PHYSICAL EXAM  ED Triage Vitals   Temp Heart Rate Resp BP SpO2   05/08/18 0600 05/08/18 0600 05/08/18 0600 05/08/18 0613 05/08/18 0600   96.5 °F (35.8 °C) (!) 134 18 160/97 98 %      Temp src Heart Rate Source Patient Position BP Location FiO2 (%)   05/08/18 0600 05/08/18 0600 -- -- --   Tympanic Monitor          Physical Exam   Constitutional: She is oriented to person, place, and time and well-developed, well-nourished, and in no distress. No distress.   HENT:   Head: Normocephalic and atraumatic.   Eyes: EOM are normal. Pupils are equal, round, and reactive to light.   Neck: Normal range of motion. Neck supple.   Cardiovascular: Normal rate, regular rhythm and normal heart sounds.    Pulmonary/Chest: Effort normal and breath sounds normal. No respiratory distress.   Abdominal: Soft. There is no tenderness. There is no rebound and no guarding.   Musculoskeletal: Normal range of motion. She exhibits no edema.   Neurological: She is alert and oriented to person, place, and time.   Skin: Skin is warm and dry. No rash noted.   Psychiatric: Mood and affect normal.   Nursing note and vitals reviewed.      LAB RESULTS  Lab Results (last 24 hours)     Procedure Component Value Units Date/Time    CBC & Differential [[id number]] Collected:  05/08/18 0625    Specimen:  Blood Updated:  05/08/18 0654    Narrative:       The following orders were created for panel order CBC & Differential.  Procedure                               Abnormality         Status                     ---------                               -----------         ------                     CBC Auto Differential[[id number]]        Abnormal            Final result                 Please view results for these tests on the individual orders.    Comprehensive Metabolic Panel [[id number]]  (Abnormal) Collected:  05/08/18 0625    Specimen:   Blood Updated:  05/08/18 0704     Glucose 125 (H) mg/dL      BUN 17 mg/dL      Creatinine 0.78 mg/dL      Sodium 144 mmol/L      Potassium 3.5 mmol/L      Chloride 104 mmol/L      CO2 24.6 mmol/L      Calcium 9.8 mg/dL      Total Protein 7.9 g/dL      Albumin 4.50 g/dL      ALT (SGPT) 21 U/L      AST (SGOT) 18 U/L      Alkaline Phosphatase 114 U/L      Total Bilirubin 0.3 mg/dL      eGFR Non African Amer 73 mL/min/1.73      Globulin 3.4 gm/dL      A/G Ratio 1.3 g/dL      BUN/Creatinine Ratio 21.8     Anion Gap 15.4 mmol/L     Protime-INR [[id number]]  (Normal) Collected:  05/08/18 0625    Specimen:  Blood Updated:  05/08/18 0703     Protime 12.6 Seconds      INR 0.96    Troponin [[id number]]  (Normal) Collected:  05/08/18 0625    Specimen:  Blood Updated:  05/08/18 0703     Troponin T <0.010 ng/mL     Narrative:       Troponin T Reference Ranges:  Less than 0.03 ng/mL:    Negative for AMI  0.03 to 0.09 ng/mL:      Indeterminant for AMI  Greater than 0.09 ng/mL: Positive for AMI    CBC Auto Differential [[id number]]  (Abnormal) Collected:  05/08/18 0625    Specimen:  Blood Updated:  05/08/18 0654     WBC 6.47 10*3/mm3      RBC 4.36 10*6/mm3      Hemoglobin 14.1 g/dL      Hematocrit 43.1 %      MCV 98.9 (H) fL      MCH 32.3 (H) pg      MCHC 32.7 g/dL      RDW 12.5 %      RDW-SD 45.4 fl      MPV 10.9 fL      Platelets 220 10*3/mm3      Neutrophil % 57.3 %      Lymphocyte % 27.5 %      Monocyte % 10.2 %      Eosinophil % 3.9 %      Basophil % 1.1 %      Immature Grans % 0.0 %      Neutrophils, Absolute 3.71 10*3/mm3      Lymphocytes, Absolute 1.78 10*3/mm3      Monocytes, Absolute 0.66 10*3/mm3      Eosinophils, Absolute 0.25 10*3/mm3      Basophils, Absolute 0.07 10*3/mm3      Immature Grans, Absolute 0.00 10*3/mm3           I ordered the above labs and reviewed the results    RADIOLOGY  XR Chest 1 View          CXR shows NAD    I ordered the above noted radiological studies. Interpreted by radiologist. Reviewed by me in  PACS.       PROCEDURES  Critical Care  Performed by: JENNIFER BYERS  Authorized by: JENNIFER BYERS     Critical care provider statement:     Critical care time (minutes):  35    Critical care time was exclusive of:  Separately billable procedures and treating other patients    Critical care was necessary to treat or prevent imminent or life-threatening deterioration of the following conditions:  Circulatory failure    Critical care was time spent personally by me on the following activities:  Development of treatment plan with patient or surrogate, discussions with consultants, discussions with primary provider, evaluation of patient's response to treatment, examination of patient, ordering and performing treatments and interventions, ordering and review of laboratory studies, ordering and review of radiographic studies, pulse oximetry, re-evaluation of patient's condition and review of old charts        EKG           EKG time: 06:08  Rhythm/Rate: SVT vs A-flutter with 2:1 conduction 134  Interpreted Contemporaneously by me, independently viewed      PROGRESS AND CONSULTS  ED Course     06:16  BP- 160/97 HR- (!) 134 Temp- 96.5 °F (35.8 °C) (Tympanic) O2 sat- 98%  Advised pt of the plan to convert with Adenosine. Plan for labs and CXR. Pt understands and agrees with the plan, all questions answered.    06:19  6mg of Adenosine given. Flutter wave seen on monitor.  Metoprolol ordered.     06:21  CXR, CMP, CBC, PT/INR, and troponin ordered.    0730   Call placed to cardiology for consult     0735  Discussed pt's case with Dr Connell (cardiology), who follow patient in office    0755  Patient converted to simus rhythm on monitor. Will obtain repeat EKG.    Repeat EKG sinus rhythm, rate 59, normal axis, no acute ST changes, no conduction delays.    MEDICAL DECISION MAKING  Results were reviewed/discussed with the patient and they were also made aware of online access. Pt also made aware that some labs, such as cultures, will  not be resulted during ER visit and follow up with PMD is necessary.     MDM  Number of Diagnoses or Management Options  Paroxysmal atrial fibrillation:      Amount and/or Complexity of Data Reviewed  Clinical lab tests: ordered and reviewed (Troponin is <0.010)  Tests in the radiology section of CPT®: reviewed and ordered (CXR shows NAD)  Tests in the medicine section of CPT®: ordered and reviewed (See EKG procedure note. )  Discuss the patient with other providers: yes (Dr Connell (cardiology))    Critical Care  Total time providing critical care: 30-74 minutes    Patient Progress  Patient progress: stable         DIAGNOSIS  Final diagnoses:   Paroxysmal atrial fibrillation       DISPOSITION  DISCHARGED    Latest Documented Vital Signs:  As of 7:40 AM  BP- 128/93 HR- (!) 129 Temp- 96.5 °F (35.8 °C) (Tympanic) O2 sat- 96%    --  Documentation assistance provided by nohemi Slaughter for Dr Fry.  Information recorded by the scrib[employer]ina was done at my direction and has been verified and validated by me.        Elizabet Slaughter  05/08/18 0722       Onofre Fry MD  05/08/18 0768       Onofre Fry MD  05/08/18 0801       Onofre Fry MD  05/08/18 8089

## 2018-05-08 NOTE — ED NOTES
Pt states she's been feeling dizzy past two days, woke up this morning with heart palpitations and is complaining of back pain and weakness.     Francie Zaragoza, FELISA  05/08/18 9329

## 2018-05-13 ENCOUNTER — APPOINTMENT (OUTPATIENT)
Dept: CT IMAGING | Facility: HOSPITAL | Age: 73
End: 2018-05-13

## 2018-05-13 ENCOUNTER — HOSPITAL ENCOUNTER (EMERGENCY)
Facility: HOSPITAL | Age: 73
Discharge: HOME | End: 2018-05-13
Attending: EMERGENCY MEDICINE | Admitting: EMERGENCY MEDICINE

## 2018-05-13 VITALS
DIASTOLIC BLOOD PRESSURE: 62 MMHG | SYSTOLIC BLOOD PRESSURE: 127 MMHG | BODY MASS INDEX: 24.27 KG/M2 | OXYGEN SATURATION: 99 % | HEART RATE: 55 BPM | TEMPERATURE: 97.4 F | RESPIRATION RATE: 17 BRPM | WEIGHT: 151 LBS | HEIGHT: 66 IN

## 2018-05-13 DIAGNOSIS — R42 DIZZINESS: Primary | ICD-10-CM

## 2018-05-13 LAB
ALBUMIN SERPL-MCNC: 4.5 G/DL (ref 3.5–5.2)
ALBUMIN/GLOB SERPL: 1.3 G/DL
ALP SERPL-CCNC: 111 U/L (ref 39–117)
ALT SERPL W P-5'-P-CCNC: 21 U/L (ref 1–33)
ANION GAP SERPL CALCULATED.3IONS-SCNC: 13.5 MMOL/L
AST SERPL-CCNC: 18 U/L (ref 1–32)
BASOPHILS # BLD AUTO: 0.05 10*3/MM3 (ref 0–0.2)
BASOPHILS NFR BLD AUTO: 0.9 % (ref 0–1.5)
BILIRUB SERPL-MCNC: 0.4 MG/DL (ref 0.1–1.2)
BUN BLD-MCNC: 12 MG/DL (ref 8–23)
BUN/CREAT SERPL: 14.1 (ref 7–25)
CALCIUM SPEC-SCNC: 10.1 MG/DL (ref 8.6–10.5)
CHLORIDE SERPL-SCNC: 103 MMOL/L (ref 98–107)
CO2 SERPL-SCNC: 25.5 MMOL/L (ref 22–29)
CREAT BLD-MCNC: 0.85 MG/DL (ref 0.57–1)
DEPRECATED RDW RBC AUTO: 46.9 FL (ref 37–54)
EOSINOPHIL # BLD AUTO: 0.15 10*3/MM3 (ref 0–0.7)
EOSINOPHIL NFR BLD AUTO: 2.7 % (ref 0.3–6.2)
ERYTHROCYTE [DISTWIDTH] IN BLOOD BY AUTOMATED COUNT: 12.9 % (ref 11.7–13)
GFR SERPL CREATININE-BSD FRML MDRD: 66 ML/MIN/1.73
GLOBULIN UR ELPH-MCNC: 3.5 GM/DL
GLUCOSE BLD-MCNC: 107 MG/DL (ref 65–99)
HCT VFR BLD AUTO: 42.5 % (ref 35.6–45.5)
HGB BLD-MCNC: 13.7 G/DL (ref 11.9–15.5)
IMM GRANULOCYTES # BLD: 0.02 10*3/MM3 (ref 0–0.03)
IMM GRANULOCYTES NFR BLD: 0.4 % (ref 0–0.5)
LYMPHOCYTES # BLD AUTO: 1.53 10*3/MM3 (ref 0.9–4.8)
LYMPHOCYTES NFR BLD AUTO: 27.1 % (ref 19.6–45.3)
MCH RBC QN AUTO: 32.4 PG (ref 26.9–32)
MCHC RBC AUTO-ENTMCNC: 32.2 G/DL (ref 32.4–36.3)
MCV RBC AUTO: 100.5 FL (ref 80.5–98.2)
MONOCYTES # BLD AUTO: 0.54 10*3/MM3 (ref 0.2–1.2)
MONOCYTES NFR BLD AUTO: 9.6 % (ref 5–12)
NEUTROPHILS # BLD AUTO: 3.37 10*3/MM3 (ref 1.9–8.1)
NEUTROPHILS NFR BLD AUTO: 59.7 % (ref 42.7–76)
PLATELET # BLD AUTO: 238 10*3/MM3 (ref 140–500)
PMV BLD AUTO: 10.6 FL (ref 6–12)
POTASSIUM BLD-SCNC: 4 MMOL/L (ref 3.5–5.2)
PROT SERPL-MCNC: 8 G/DL (ref 6–8.5)
RBC # BLD AUTO: 4.23 10*6/MM3 (ref 3.9–5.2)
SODIUM BLD-SCNC: 142 MMOL/L (ref 136–145)
TROPONIN T SERPL-MCNC: <0.01 NG/ML (ref 0–0.03)
WBC NRBC COR # BLD: 5.64 10*3/MM3 (ref 4.5–10.7)

## 2018-05-13 PROCEDURE — 93005 ELECTROCARDIOGRAM TRACING: CPT | Performed by: EMERGENCY MEDICINE

## 2018-05-13 PROCEDURE — 93010 ELECTROCARDIOGRAM REPORT: CPT | Performed by: INTERNAL MEDICINE

## 2018-05-13 PROCEDURE — 25010000002 PROMETHAZINE PER 50 MG: Performed by: EMERGENCY MEDICINE

## 2018-05-13 PROCEDURE — 84484 ASSAY OF TROPONIN QUANT: CPT | Performed by: EMERGENCY MEDICINE

## 2018-05-13 PROCEDURE — 80053 COMPREHEN METABOLIC PANEL: CPT | Performed by: EMERGENCY MEDICINE

## 2018-05-13 PROCEDURE — 99284 EMERGENCY DEPT VISIT MOD MDM: CPT

## 2018-05-13 PROCEDURE — 96374 THER/PROPH/DIAG INJ IV PUSH: CPT

## 2018-05-13 PROCEDURE — 70450 CT HEAD/BRAIN W/O DYE: CPT

## 2018-05-13 PROCEDURE — 96361 HYDRATE IV INFUSION ADD-ON: CPT

## 2018-05-13 PROCEDURE — 85025 COMPLETE CBC W/AUTO DIFF WBC: CPT | Performed by: EMERGENCY MEDICINE

## 2018-05-13 RX ORDER — MECLIZINE HYDROCHLORIDE 25 MG/1
25 TABLET ORAL EVERY 6 HOURS PRN
Qty: 20 TABLET | Refills: 0 | Status: SHIPPED | OUTPATIENT
Start: 2018-05-13 | End: 2019-08-28

## 2018-05-13 RX ORDER — SODIUM CHLORIDE 0.9 % (FLUSH) 0.9 %
10 SYRINGE (ML) INJECTION AS NEEDED
Status: DISCONTINUED | OUTPATIENT
Start: 2018-05-13 | End: 2018-05-13 | Stop reason: HOSPADM

## 2018-05-13 RX ORDER — MECLIZINE HYDROCHLORIDE 25 MG/1
25 TABLET ORAL ONCE
Status: COMPLETED | OUTPATIENT
Start: 2018-05-13 | End: 2018-05-13

## 2018-05-13 RX ORDER — PROMETHAZINE HYDROCHLORIDE 25 MG/ML
6.25 INJECTION, SOLUTION INTRAMUSCULAR; INTRAVENOUS ONCE
Status: COMPLETED | OUTPATIENT
Start: 2018-05-13 | End: 2018-05-13

## 2018-05-13 RX ADMIN — MECLIZINE 25 MG: 25 TABLET ORAL at 11:45

## 2018-05-13 RX ADMIN — SODIUM CHLORIDE 1000 ML: 9 INJECTION, SOLUTION INTRAVENOUS at 10:26

## 2018-05-13 RX ADMIN — PROMETHAZINE HYDROCHLORIDE 6.25 MG: 25 INJECTION INTRAMUSCULAR; INTRAVENOUS at 10:24

## 2018-05-13 NOTE — ED PROVIDER NOTES
" EMERGENCY DEPARTMENT ENCOUNTER    CHIEF COMPLAINT  Chief Complaint: Light-headedness  History given by: Pt  History limited by: Nothing  Room Number: 36/36  PMD: Geri Hensley MD    HPI:  Pt is a 72 y.o. female who presents complaining of light-headedness onset about six days ago. Pt also complains of dizziness worsened with laying flat and nausea but denies ear ache, nausea, palpitations, SOA, headache, or any other symptoms at this time. Pt reports similar symptoms which was resolved with antivert at that time. She states she has an appointment with Dr. Connell (cardiologist) tomorrow.    Duration:  About six days  Onset: gradual  Timing: constant  Location: n/a  Radiation: none  Quality: \"light-headedness\"  Intensity/Severity: moderate  Progression: unchanged  Associated Symptoms:  dizziness worsened with laying flat and nausea   Aggravating Factors: none  Alleviating Factors: none  Previous Episodes: Pt reports hx of vertigo.  Treatment before arrival: Pt received no treatment PTA.    PAST MEDICAL HISTORY  Active Ambulatory Problems     Diagnosis Date Noted   • No Active Ambulatory Problems     Resolved Ambulatory Problems     Diagnosis Date Noted   • No Resolved Ambulatory Problems     Past Medical History:   Diagnosis Date   • Atrial fibrillation    • Blood in stool    • Chest pain    • Fracture of radius    • Frequent UTI    • GERD (gastroesophageal reflux disease)    • Hemorrhoid    • Hypercalcemia    • IBS (irritable bowel syndrome)    • Parathyroid disorder    • Wrist pain        PAST SURGICAL HISTORY  Past Surgical History:   Procedure Laterality Date   • BLADDER SURGERY      tvt sling   • CARDIAC CATHETERIZATION     • COLONOSCOPY  10/23/2015    Non-thrombosed EH, Toruous colon, Diverticulosis, IH    • COLONOSCOPY  08/17/2010    Non-thrombosed EH, Diverticulosis, Tortuous colon, IH    • COLONOSCOPY  05/14/2007    Non thrombosed EH, Tortuous colon, One non bleeding polyp   • COLONOSCOPY N/A 5/2/2017 "    Procedure: COLONOSCOPY TO CECUM AND TI WITH COLD BIOPSIES POLYPECTOMY;  Surgeon: Deepak MARTINES MD;  Location: [facility] ENDOSCOPY;  Service:    • ENDOSCOPY N/A 5/2/2017    Procedure: ESOPHAGOGASTRODUODENOSCOPY  WITH COLD BIOPSY;  Surgeon: Deepak MARTINES MD;  Location: [facility] ENDOSCOPY;  Service:    • GALLBLADDER SURGERY     • HYSTERECTOMY      removed tubes & rt ovary also   • OOPHORECTOMY Left    • ORIF WRIST FRACTURE Left 12/30/2016    Procedure:  LT DISTAL RADIUS OPEN REDUCTION INTERNAL FIXATION;  Surgeon: Shawn Dorsey MD;  Location: [facility] MAIN OR;  Service:    • TONSILLECTOMY     • TONSILLECTOMY     • VARICOSE VEIN SURGERY      used wire       FAMILY HISTORY  Family History   Problem Relation Age of Onset   • Hypertension Mother    • Heart attack Father    • Hypertension Father    • Stroke Paternal Grandmother    • Pancreatic cancer Brother    • Colon cancer Maternal Uncle        SOCIAL HISTORY  Social History     Social History   • Marital status:      Spouse name: N/A   • Number of children: N/A   • Years of education: N/A     Occupational History   • Not on file.     Social History Main Topics   • Smoking status: Never Smoker   • Smokeless tobacco: Never Used   • Alcohol use Yes      Comment: rarely   • Drug use: No      Comment: prescribed by MD   • Sexual activity: Defer     Other Topics Concern   • Not on file     Social History Narrative   • No narrative on file       ALLERGIES  Shellfish-derived products; Shrimp (diagnostic); Other; and Penicillins    REVIEW OF SYSTEMS  Review of Systems   Constitutional: Negative for fever.   HENT: Negative for sore throat.    Eyes: Negative.    Respiratory: Negative for cough and shortness of breath.    Cardiovascular: Negative for chest pain.   Gastrointestinal: Positive for nausea. Negative for abdominal pain, diarrhea and vomiting.   Genitourinary: Negative for dysuria.   Musculoskeletal: Negative for neck pain.   Skin: Negative for rash.    Allergic/Immunologic: Negative.    Neurological: Positive for dizziness (worsened with laying flat) and light-headedness. Negative for weakness, numbness and headaches.   Hematological: Negative.    Psychiatric/Behavioral: Negative.    All other systems reviewed and are negative.      PHYSICAL EXAM  ED Triage Vitals [05/13/18 1008]   Temp Heart Rate Resp BP SpO2   96.9 °F (36.1 °C) 88 16 -- 97 %      Temp src Heart Rate Source Patient Position BP Location FiO2 (%)   Tympanic -- -- -- --       Physical Exam   Constitutional: She is oriented to person, place, and time and well-developed, well-nourished, and in no distress. No distress.   HENT:   Head: Normocephalic and atraumatic.   Right Ear: Tympanic membrane normal.   Left Ear: Tympanic membrane normal.   Eyes: EOM are normal. Pupils are equal, round, and reactive to light.   Neck: Normal range of motion. Neck supple. Carotid bruit is not present.   Cardiovascular: Normal rate, regular rhythm and normal heart sounds.    Pulmonary/Chest: Effort normal and breath sounds normal. No respiratory distress.   Abdominal: Soft. There is no tenderness. There is no rebound and no guarding.   Musculoskeletal: Normal range of motion. She exhibits no edema.   Neurological: She is alert and oriented to person, place, and time. She has normal sensation and normal strength.   No nystagmus. Negative finger to nose and heel to shin.   Skin: Skin is warm and dry. No rash noted.   Psychiatric: Mood and affect normal.   Nursing note and vitals reviewed.      LAB RESULTS  Lab Results (last 24 hours)     Procedure Component Value Units Date/Time    CBC & Differential [[id number]] Collected:  05/13/18 1027    Specimen:  Blood Updated:  05/13/18 1039    Narrative:       The following orders were created for panel order CBC & Differential.  Procedure                               Abnormality         Status                     ---------                               -----------          ------                     CBC Auto Differential[[id number]]        Abnormal            Final result                 Please view results for these tests on the individual orders.    Comprehensive Metabolic Panel [[id number]]  (Abnormal) Collected:  05/13/18 1027    Specimen:  Blood Updated:  05/13/18 1103     Glucose 107 (H) mg/dL      BUN 12 mg/dL      Creatinine 0.85 mg/dL      Sodium 142 mmol/L      Potassium 4.0 mmol/L      Chloride 103 mmol/L      CO2 25.5 mmol/L      Calcium 10.1 mg/dL      Total Protein 8.0 g/dL      Albumin 4.50 g/dL      ALT (SGPT) 21 U/L      AST (SGOT) 18 U/L      Alkaline Phosphatase 111 U/L      Total Bilirubin 0.4 mg/dL      eGFR Non African Amer 66 mL/min/1.73      Globulin 3.5 gm/dL      A/G Ratio 1.3 g/dL      BUN/Creatinine Ratio 14.1     Anion Gap 13.5 mmol/L     Troponin [[id number]]  (Normal) Collected:  05/13/18 1027    Specimen:  Blood Updated:  05/13/18 1105     Troponin T <0.010 ng/mL     Narrative:       Troponin T Reference Ranges:  Less than 0.03 ng/mL:    Negative for AMI  0.03 to 0.09 ng/mL:      Indeterminant for AMI  Greater than 0.09 ng/mL: Positive for AMI    CBC Auto Differential [[id number]]  (Abnormal) Collected:  05/13/18 1027    Specimen:  Blood Updated:  05/13/18 1039     WBC 5.64 10*3/mm3      RBC 4.23 10*6/mm3      Hemoglobin 13.7 g/dL      Hematocrit 42.5 %      .5 (H) fL      MCH 32.4 (H) pg      MCHC 32.2 (L) g/dL      RDW 12.9 %      RDW-SD 46.9 fl      MPV 10.6 fL      Platelets 238 10*3/mm3      Neutrophil % 59.7 %      Lymphocyte % 27.1 %      Monocyte % 9.6 %      Eosinophil % 2.7 %      Basophil % 0.9 %      Immature Grans % 0.4 %      Neutrophils, Absolute 3.37 10*3/mm3      Lymphocytes, Absolute 1.53 10*3/mm3      Monocytes, Absolute 0.54 10*3/mm3      Eosinophils, Absolute 0.15 10*3/mm3      Basophils, Absolute 0.05 10*3/mm3      Immature Grans, Absolute 0.02 10*3/mm3           I ordered the above labs and reviewed the results    RADIOLOGY  CT  Head Without Contrast   Final Result           No acute intracranial hemorrhage or hydrocephalus. If there is   further clinical concern, MRI could be considered for further   evaluation.       This report was finalized on 5/13/2018 11:06 AM by Dr. Alexander Cavazos M.D.               I ordered the above noted radiological studies. Interpreted by radiologist. Reviewed by me in PACS.       PROCEDURES  Procedures    EKG           EKG time: 1041  Rhythm/Rate: NSR, 65  P waves and RI: LAE  QRS, axis: nml  ST and T waves: nonspecific T wave change anteriorly     Interpreted Contemporaneously by me, independently viewed  Unchanged compared to prior 5/8/2018    PROGRESS AND CONSULTS  ED Course     1021 Ordered CBC, CMP, Troponin, and CT Head for further evaluation. Ordered phenergan for treatment of symptoms.     1122 Ordered antivert for treatment of symptoms    1259 BP- 127/62 HR- 53 Temp- 96.9 °F (36.1 °C) (Tympanic) O2 sat- 100%. Rechecked the patient who is in NAD and is resting comfortably. She has ambulated without difficulty in the Ed. Discussed negative lab and imaging results with pt. Discussed with pt her symptoms are most likely secondary to vertigo. Suggested pt not take any sudden movements. Suggested pt follow up with Dr. Connell as scheduled. Pt understands and agrees with the plan. All questions answered.    MEDICAL DECISION MAKING  Results were reviewed/discussed with the patient and they were also made aware of online access. Pt also made aware that some labs, such as cultures, will not be resulted during ER visit and follow up with PMD is necessary.     MDM  Number of Diagnoses or Management Options  Dizziness:   Diagnosis management comments: Patient had a normal neuro exam.  Head CT, EKG, and labs were unremarkable.  Patient was given IV fluids, IV Phenergan, and oral meclizine.  She was able to ambulate in the ER without assistance.  She will be discharged with a prescription for meclizine and  advised to follow-up with her primary care doctor in the next several days if symptoms are not improving.       Amount and/or Complexity of Data Reviewed  Clinical lab tests: ordered and reviewed (WBC - 5.64, Troponin - <.010)  Tests in the radiology section of CPT®: ordered and reviewed  Tests in the medicine section of CPT®: ordered and reviewed (See EKG procedure note.)  Decide to obtain previous medical records or to obtain history from someone other than the patient: yes  Review and summarize past medical records: yes (Pt was seen in the ED five days ago for paroxysmal A-Fib and dizziness. She was given adenosine and IV metoprolol. She converted to NSR at that time.)    Patient Progress  Patient progress: stable         DIAGNOSIS  Final diagnoses:   Dizziness       DISPOSITION  DISCHARGE    Patient discharged in stable condition.    Reviewed implications of results, diagnosis, meds, responsibility to follow up, warning signs and symptoms of possible worsening, potential complications and reasons to return to ER, including new or worsening symptoms.    Patient/Family voiced understanding of above instructions.    Discussed plan for discharge, as there is no emergent indication for admission. Patient referred to primary care provider for BP management due to today's BP. Pt/family is agreeable and understands need for follow up and repeat testing.  Pt is aware that discharge does not mean that nothing is wrong but it indicates no emergency is present that requires admission and they must continue care with follow-up as given below or physician of their choice.     FOLLOW-UP  Grei Hensley MD  [address] [address] [facility] [place] [address] [phone]    Call in 2 days  If symptoms persist         Medication List      New Prescriptions    meclizine 25 MG tablet  Commonly known as:  ANTIVERT  Take 1 tablet by mouth Every 6 (Six) Hours As Needed for dizziness.              Latest Documented Vital Signs:  As of 3:12  PM  BP- 127/62 HR- 55 Temp- 97.4 °F (36.3 °C) (Tympanic) O2 sat- 99%    --  Documentation assistance provided by nohemi Lyles for Dr. Schumacher.  Information recorded by the [facility]ir[facility] was done at my direction and has been verified and validated by me.     Minesh Lyles  05/13/18 1322       Marvel Schumacher MD  05/13/18 5710

## 2018-05-14 ENCOUNTER — OFFICE VISIT (OUTPATIENT)
Dept: CARDIOLOGY | Facility: CLINIC | Age: 73
End: 2018-05-14

## 2018-05-14 VITALS
BODY MASS INDEX: 24.78 KG/M2 | SYSTOLIC BLOOD PRESSURE: 138 MMHG | HEIGHT: 66 IN | DIASTOLIC BLOOD PRESSURE: 84 MMHG | HEART RATE: 63 BPM | WEIGHT: 154.2 LBS

## 2018-05-14 DIAGNOSIS — I48.0 PAROXYSMAL ATRIAL FIBRILLATION (HCC): Primary | ICD-10-CM

## 2018-05-14 DIAGNOSIS — R42 VERTIGO: ICD-10-CM

## 2018-05-14 PROCEDURE — 99213 OFFICE O/P EST LOW 20 MIN: CPT | Performed by: NURSE PRACTITIONER

## 2018-05-14 PROCEDURE — 93000 ELECTROCARDIOGRAM COMPLETE: CPT | Performed by: NURSE PRACTITIONER

## 2018-05-14 RX ORDER — DILTIAZEM HYDROCHLORIDE 360 MG/1
360 CAPSULE, EXTENDED RELEASE ORAL DAILY
Qty: 30 CAPSULE | Refills: 5 | Status: SHIPPED | OUTPATIENT
Start: 2018-05-14 | End: 2018-05-14 | Stop reason: SDUPTHER

## 2018-05-14 RX ORDER — DILTIAZEM HYDROCHLORIDE 360 MG/1
360 CAPSULE, EXTENDED RELEASE ORAL DAILY
Qty: 30 CAPSULE | Refills: 5 | Status: SHIPPED | OUTPATIENT
Start: 2018-05-14 | End: 2018-12-03 | Stop reason: SDUPTHER

## 2018-05-14 RX ORDER — FLUTICASONE PROPIONATE 50 MCG
1 SPRAY, SUSPENSION (ML) NASAL DAILY
COMMUNITY
End: 2018-08-24

## 2018-05-14 NOTE — PROGRESS NOTES
Date of Office Visit: 2018  Encounter Provider: ORACIO Abrams  Place of Service: [facility] CARDIOLOGY  Patient Name: Rebeca De Souza  :1945    Chief Complaint   Patient presents with   • Atrial Fibrillation   :     HPI: Rebeca De Souza is a 72 y.o. female comes in today for follow up from emergency room for atrial fibrillation.    She is a patient of Dr. Connell and I'm seeing her for the first time today.  I reviewed her records.    In the past, she's had multiple cardiovascular evaluations.  She had multiple negative stress test in the past.  In , due to continued chest pain, she had a negative cardiac catheterization performed at [facility].    In 2015, she was found to be in A. fib.  She went into sinus rhythm on IV Cardizem.  An echo showed normal LV function with no significant valve disease.  She then or 30 day event monitor after that with no events.  She was taken off anticoagulation.    May 8, 2018, she came to the ER complaining of palpitations.  Heart rate was 134.  EKG showed SVT versus a flutter.  Adenosine was given ×2 as well as IV metoprolol.  She converted to sinus rhythm with repeat EKG at a rate of 59.  He was started on Eliquis 5 mg twice a day.    May 13, 2018, she came in with complaining of lightheadedness that had continued from 6 days prior.  Her EKG showed normal sinus rhythm.  Antivert was ordered.    Today, she comes in for follow-up.  2 days prior to her heart starting to palpate, she developed dizziness.  Worse when laying down or turning her head.  Feels like room spinning.  This is been going on for about 7 days now.  She's had this in the past and was given antivertigo.  Since going to the emergency room, she has not had any problems with heart palpitations.  When she feels herself go into A. fib, she can feel her heart really jumping around.  She was unable to tell if she had dizziness because she was  having these other symptoms.  She says she does have chest pain with it.  Denies shortness of breath.  Denies any syncope or presyncope.    Past Medical History:   Diagnosis Date   • Atrial fibrillation     history on med   • Blood in stool    • Chest pain     history   • Fracture of radius     fall 12/27/16   • Frequent UTI     on prophylactic poab   • GERD (gastroesophageal reflux disease)    • Hemorrhoid    • Hypercalcemia    • IBS (irritable bowel syndrome)    • Parathyroid disorder     MD monitoring   • Wrist pain     left        Past Surgical History:   Procedure Laterality Date   • BLADDER SURGERY      tvt sling   • CARDIAC CATHETERIZATION     • COLONOSCOPY  10/23/2015    Non-thrombosed EH, Toruous colon, Diverticulosis, IH    • COLONOSCOPY  08/17/2010    Non-thrombosed EH, Diverticulosis, Tortuous colon, IH    • COLONOSCOPY  05/14/2007    Non thrombosed EH, Tortuous colon, One non bleeding polyp   • COLONOSCOPY N/A 5/2/2017    Procedure: COLONOSCOPY TO CECUM AND TI WITH COLD BIOPSIES POLYPECTOMY;  Surgeon: Deepak MARTINES MD;  Location: [facility] ENDOSCOPY;  Service:    • ENDOSCOPY N/A 5/2/2017    Procedure: ESOPHAGOGASTRODUODENOSCOPY  WITH COLD BIOPSY;  Surgeon: Deepak MARTINES MD;  Location: [facility] ENDOSCOPY;  Service:    • GALLBLADDER SURGERY     • HYSTERECTOMY      removed tubes & rt ovary also   • OOPHORECTOMY Left    • ORIF WRIST FRACTURE Left 12/30/2016    Procedure:  LT DISTAL RADIUS OPEN REDUCTION INTERNAL FIXATION;  Surgeon: Shawn Dorsey MD;  Location: [facility] MAIN OR;  Service:    • TONSILLECTOMY     • TONSILLECTOMY     • VARICOSE VEIN SURGERY      used wire           Review of Systems   Cardiovascular: Positive for palpitations.   Neurological: Positive for dizziness.     All other systems reviewed and are negative    Allergies   Allergen Reactions   • Shellfish-Derived Products Swelling     Mouth swelled   • Shrimp (Diagnostic) Swelling   • Other Itching and Rash     Cats ringworm    •  "Penicillins Rash     childhood       All aspects of family and social history reviewed.           Objective:     Vitals:    05/14/18 0841   BP: 138/84   BP Location: Left arm   Pulse: 63   Weight: 69.9 kg (154 lb 3.2 oz)   Height: 167.6 cm (66\")     Body mass index is 24.89 kg/m².    PHYSICAL EXAM:  Physical Exam   Constitutional: She is oriented to person, place, and time. She appears well-developed and well-nourished. No distress.   HENT:   Head: Normocephalic.   Eyes: Conjunctivae are normal. Pupils are equal, round, and reactive to light. No scleral icterus.   Neck: Normal carotid pulses, no hepatojugular reflux and no JVD present. Carotid bruit is not present. No tracheal deviation, no edema and no erythema present. No thyromegaly present.   Cardiovascular: Normal rate, regular rhythm, S1 normal, S2 normal, normal heart sounds and intact distal pulses.   No extrasystoles are present. PMI is not displaced.  Exam reveals no gallop, no distant heart sounds and no friction rub.    No murmur heard.  Pulses:       Carotid pulses are 2+ on the right side, and 2+ on the left side.       Radial pulses are 2+ on the right side, and 2+ on the left side.        Femoral pulses are 2+ on the right side, and 2+ on the left side.       Dorsalis pedis pulses are 2+ on the right side, and 2+ on the left side.        Posterior tibial pulses are 2+ on the right side, and 2+ on the left side.   Pulmonary/Chest: Effort normal and breath sounds normal. No respiratory distress. She has no decreased breath sounds. She has no wheezes. She has no rhonchi. She has no rales. She exhibits no tenderness.   Abdominal: Soft. Bowel sounds are normal. She exhibits no distension and no mass. There is no hepatosplenomegaly. There is no tenderness. There is no rebound and no guarding.   Musculoskeletal: She exhibits no edema, tenderness or deformity.   Neurological: She is alert and oriented to person, place, and time.   Skin: Skin is warm and dry. " No rash noted. She is not diaphoretic. No cyanosis or erythema. No pallor. Nails show no clubbing.   Psychiatric: She has a normal mood and affect. Her speech is normal and behavior is normal. Judgment and thought content normal.         ECG 12 Lead  Date/Time: 5/14/2018 9:12 AM  Performed by: SHERRY CARDENAS  Authorized by: SHERRY CARDENAS   Comparison: compared with previous ECG from 5/8/2018  Rhythm: sinus rhythm  Rate: normal  BPM: 63  Conduction: conduction normal  ST Segments: ST segments normal  ST Flattening: V2 and V3  QRS axis: normal  Clinical impression: abnormal ECG  Comments: Indication: afib                Assessment:       Diagnosis Plan   1. Paroxysmal atrial fibrillation     2. Vertigo          Orders Placed This Encounter   Procedures   • ECG 12 Lead     This order was created via procedure documentation       Current Outpatient Prescriptions   Medication Sig Dispense Refill   • diltiaZEM CD (CARDIZEM CD) 360 MG 24 hr capsule Take 1 capsule by mouth Daily. 30 capsule 5   • esomeprazole (nexIUM) 40 MG capsule Take 1 capsule by mouth Daily. (Patient taking differently: Take 40 mg by mouth Daily As Needed.) 30 capsule 11   • estradiol (VAGIFEM) 10 MCG tablet vaginal tablet Insert 1 tablet into the vagina 1 (One) Time Per Week.     • fluticasone (FLONASE) 50 MCG/ACT nasal spray 1 spray into each nostril Daily.     • meclizine (ANTIVERT) 25 MG tablet Take 1 tablet by mouth Every 6 (Six) Hours As Needed for dizziness. 20 tablet 0   • nitrofurantoin (MACRODANTIN) 50 MG capsule Take 50 mg by mouth Every Morning.     • metoprolol tartrate (LOPRESSOR) 25 MG tablet Take up to twice daily as needed for palpitations. 60 tablet 5   • rivaroxaban (XARELTO) 20 MG tablet Take 1 tablet by mouth Daily. 30 tablet 5     No current facility-administered medications for this visit.             Plan:       1. Atrial Fibrillation and Atrial Flutter  Assessment  • The patient has paroxysmal atrial fibrillation  • This  is non-valvular in etiology  • The patient's CHADS2-VASc score is 2  • A [id number] score of 2 or more is considered a high risk for a thromboembolic event  • Apixaban prescribed    Plan  • Attempt to maintain sinus rhythm  • Add rivaroxaban for antithrombotic therapy  • Continue diltiazem for rhythm control      Paroxysmal atrial fibrillation.  This episode started 2 days after vertigo.  She had been unable to sleep due to her vertigo.  She immediately felt palpitations.  Her diltiazem was increased when she left the emergency room to 360 mg.  Her blood pressure stable so I do not think her dizziness is related to this.  She would like to be switched from Eliquis to Xarelto due to cost.  I'm going to give her metoprolol tartrate 25 mg to take up to twice daily as needed for palpitations.  I've asked her if she feels her palpitations start, take the metoprolol.  Give it a few hours to see if her palpitations ceased.  If not, she can call into the office to see if we can stop her atrial fibrillation.  This is only her second episode in approximately 2 years.    2. Vertigo-on antivert. Pt to see PCP    Follow up in office in 2 months for evaluation.   As always, it has been a pleasure to participate in this patient's care.      Sincerely,      ORACIO Abrams

## 2018-06-25 ENCOUNTER — OFFICE VISIT (OUTPATIENT)
Dept: CARDIOLOGY | Facility: CLINIC | Age: 73
End: 2018-06-25

## 2018-06-25 VITALS
HEART RATE: 65 BPM | DIASTOLIC BLOOD PRESSURE: 80 MMHG | SYSTOLIC BLOOD PRESSURE: 140 MMHG | BODY MASS INDEX: 24.17 KG/M2 | WEIGHT: 154 LBS | HEIGHT: 67 IN

## 2018-06-25 DIAGNOSIS — I48.0 PAROXYSMAL ATRIAL FIBRILLATION (HCC): Primary | ICD-10-CM

## 2018-06-25 DIAGNOSIS — I48.92 ATRIAL FLUTTER BY ELECTROCARDIOGRAM (HCC): ICD-10-CM

## 2018-06-25 DIAGNOSIS — E78.2 MIXED HYPERLIPIDEMIA: ICD-10-CM

## 2018-06-25 DIAGNOSIS — R07.9 CHEST PAIN ON EXERTION: ICD-10-CM

## 2018-06-25 DIAGNOSIS — R06.02 SOB (SHORTNESS OF BREATH) ON EXERTION: ICD-10-CM

## 2018-06-25 DIAGNOSIS — R42 VERTIGO: ICD-10-CM

## 2018-06-25 PROCEDURE — 99214 OFFICE O/P EST MOD 30 MIN: CPT | Performed by: NURSE PRACTITIONER

## 2018-06-25 PROCEDURE — 93000 ELECTROCARDIOGRAM COMPLETE: CPT | Performed by: NURSE PRACTITIONER

## 2018-06-25 NOTE — PROGRESS NOTES
"Date of Office Visit: 2018  Encounter Provider: ORACIO Cano  Place of Service: [facility] CARDIOLOGY  Patient Name: Rebeca De Souza  :1945    Chief Complaint   Patient presents with   • Atrial Fibrillation   • Chest Pain   • Shortness of Breath   • Dizziness   • Edema   • Fatigue   :     HPI: Rebeca De Souza is a 73 y.o. female is a patient of Dr. Connell. I am seeing her today and have reviewed the records.     Her past medical history is significant of Paroxysmal atrial fibrillation, hyperlipidemia, vertigo, and precordial pain.    In the past she has had multiple cardiovascular evaluations with negative stress testing.  In , she had some chest pain and had a negative cardiac catheterization performed at [facility].    In 2015 she was found to be in atrial fibrillation.  She converted to sinus rhythm with IV Cardizem.  She was started on oral Cardizem.  She had normal LV function with no significant valvular disease.  She therefore 30 day event monitor which showed no events and she was taken off of anticoagulation.    Height last echocardiogram was in 2015 that showed normal systolic function with an EF of 61%, trace to mild mitral regurgitation, normal RVSP, normal saline study.  There was fluid in the posterior to the ascending aorta measuring 1.3 cm concerning for possible dissection or inflammation.    There was an emergency department visit on 2018 with complaint of palpitations and feeling \"heart jumping around\".  She had some dizziness also.  This is been going on for the past 3 days.  She also complained of upper back pain.  Her heart rate was 134. troponin was negative.  EKG was  atrial flutter with 2-1 conduction.  adenosine was given x2  along with metoprolol IV .  She then converted to sinus rhythm with a heart rate of 59.  Heart diltiazem was increased.  She was discharged in stable condition.  He was another " "emergency department visit on 5/13/2018 with complaint of dizziness.  He reported that dizziness was worse with laying flat.  There was no nystagmus.  Heart rate was 88.  There is a CT of the head with no acute intracranial hemorrhage or hydrocephalus.  She was discharged in stable condition with a prescription for Antivert.    She was last seen in the office on 5/14/2018.  She reported that occasionally she felt as though the room was spinning.  She also complained of palpitations when she is in atrial fibrillation \"filling the heart jumping around\".  She was given a when necessary prescription for Lopressor 25 mg twice daily as needed for palpitations.     She presents today for reassessment.  She continues to have palpitations and fatigue.  She has occasional shortness of breath with exertion which is overall improved.  She has swelling in the left ankle.  She has lightheadedness which is getting better over the past week.  When she has palpitations she has some chest tightness.  She also has occasional chest tightness when she walks.  She has related that to indigestion.  She reports that she had Apley maneuvers which has improved her vertigo.  She performs some of these exercises every morning. She has started to take diltiazem at night.  She also is taking Xarelto instead of Eliquis due to financial reasons. She is not having bleeding issues.  She had one episode on June 1 were she had palpitations after walking up steps.  This persisted for some times she took one dose of metoprolol 25 mg.  The palpitations subsided after a couple hours.  She walks 2-3 days a week for 2 miles.  She completed this and 40 minutes and doesn't have extreme shortness of breath with that she has a history of having pain while walking usually after eating however she has not had any lately.   Allergies   Allergen Reactions   • Shellfish-Derived Products Swelling     Mouth swelled   • Shrimp (Diagnostic) Swelling   • Other Itching " and Rash     Cats ringworm    • Penicillins Rash     childhood       Past Medical History:   Diagnosis Date   • Atrial fibrillation     history on med   • Blood in stool    • Chest pain     history   • Dizziness    • Fracture of radius     fall 12/27/16   • Frequent UTI     on prophylactic poab   • GERD (gastroesophageal reflux disease)    • Hemorrhoid    • Hypercalcemia    • IBS (irritable bowel syndrome)    • PAF (paroxysmal atrial fibrillation)    • Parathyroid disorder     MD monitoring   • Vertigo    • Wrist pain     left        Past Surgical History:   Procedure Laterality Date   • BLADDER SURGERY      tvt sling   • CARDIAC CATHETERIZATION     • COLONOSCOPY  10/23/2015    Non-thrombosed EH, Toruous colon, Diverticulosis, IH    • COLONOSCOPY  08/17/2010    Non-thrombosed EH, Diverticulosis, Tortuous colon, IH    • COLONOSCOPY  05/14/2007    Non thrombosed EH, Tortuous colon, One non bleeding polyp   • COLONOSCOPY N/A 5/2/2017    Procedure: COLONOSCOPY TO CECUM AND TI WITH COLD BIOPSIES POLYPECTOMY;  Surgeon: Deepak MARTINES MD;  Location: [facility] ENDOSCOPY;  Service:    • ENDOSCOPY N/A 5/2/2017    Procedure: ESOPHAGOGASTRODUODENOSCOPY  WITH COLD BIOPSY;  Surgeon: Deepak MARTINES MD;  Location: [facility] ENDOSCOPY;  Service:    • GALLBLADDER SURGERY     • HYSTERECTOMY      removed tubes & rt ovary also   • OOPHORECTOMY Left    • ORIF WRIST FRACTURE Left 12/30/2016    Procedure:  LT DISTAL RADIUS OPEN REDUCTION INTERNAL FIXATION;  Surgeon: Shawn Dorsey MD;  Location: [facility] MAIN OR;  Service:    • TONSILLECTOMY     • TONSILLECTOMY     • VARICOSE VEIN SURGERY      used wire     Family and social history reviewed.     Review of Systems   Constitution: Positive for malaise/fatigue.   Cardiovascular: Positive for dyspnea on exertion and leg swelling.   Respiratory: Positive for snoring.    Neurological: Positive for dizziness.     All other systems were reviewed and are negative        Objective:     Vitals:  "   06/25/18 1337   BP: 140/80   BP Location: Left arm   Patient Position: Sitting   Pulse: 65   Weight: 69.9 kg (154 lb)   Height: 168.9 cm (66.5\")     Body mass index is 24.48 kg/m².    PHYSICAL EXAM:  Physical Exam   Constitutional: She is oriented to person, place, and time. She appears well-developed and well-nourished. No distress.   HENT:   Head: Normocephalic.   Eyes: Conjunctivae are normal.   Glasses on   Neck: Normal range of motion. No JVD present.   Cardiovascular: Normal rate, regular rhythm, normal heart sounds and intact distal pulses.    No murmur heard.  Pulses:       Carotid pulses are 2+ on the right side, and 2+ on the left side.       Radial pulses are 2+ on the right side, and 2+ on the left side.        Posterior tibial pulses are 2+ on the right side, and 2+ on the left side.   Pulmonary/Chest: Effort normal and breath sounds normal. No respiratory distress. She has no wheezes. She has no rhonchi. She has no rales. She exhibits no tenderness.   Abdominal: Soft. Bowel sounds are normal. She exhibits no distension.   Musculoskeletal: Normal range of motion. She exhibits no edema.   Neurological: She is alert and oriented to person, place, and time.   Skin: Skin is warm, dry and intact. No rash noted. She is not diaphoretic. No cyanosis.   Psychiatric: She has a normal mood and affect. Her behavior is normal. Judgment and thought content normal.         ECG 12 Lead  Date/Time: 6/25/2018 1:48 PM  Performed by: JULISSA QUINTEROS  Authorized by: JULISSA QUINTEROS   Comparison: compared with previous ECG from 5/14/2018  Similar to previous ECG  Rhythm: sinus rhythm  Rate: normal  BPM: 65  ST Segments: ST segments normal  Clinical impression: non-specific ECG  Comments: Nonspecific T wave abnormalities            Current Outpatient Prescriptions   Medication Sig Dispense Refill   • diltiaZEM CD (CARDIZEM CD) 360 MG 24 hr capsule Take 1 capsule by mouth Daily. 30 capsule 5   • esomeprazole (nexIUM) 40 MG " capsule Take 1 capsule by mouth Daily. (Patient taking differently: Take 40 mg by mouth Daily As Needed.) 30 capsule 11   • estradiol (VAGIFEM) 10 MCG tablet vaginal tablet Insert 1 tablet into the vagina 1 (One) Time Per Week.     • Glucosamine-MSM-Hyaluronic Acd (JOINT HEALTH PO) Take  by mouth Daily.     • metoprolol tartrate (LOPRESSOR) 25 MG tablet Take up to twice daily as needed for palpitations. 60 tablet 5   • nitrofurantoin (MACRODANTIN) 50 MG capsule Take 50 mg by mouth Every Morning.     • rivaroxaban (XARELTO) 20 MG tablet Take 1 tablet by mouth Daily. 30 tablet 5   • fluticasone (FLONASE) 50 MCG/ACT nasal spray 1 spray into each nostril Daily.     • meclizine (ANTIVERT) 25 MG tablet Take 1 tablet by mouth Every 6 (Six) Hours As Needed for dizziness. 20 tablet 0     No current facility-administered medications for this visit.      Assessment:       Diagnosis Plan   1. Paroxysmal atrial fibrillation  ECG 12 Lead    Adult Transthoracic Echo Complete W/ Cont if Necessary Per Protocol    Treadmill Stress Test   2. Vertigo     3. Mixed hyperlipidemia     4. Atrial flutter by electrocardiogram  ECG 12 Lead    Adult Transthoracic Echo Complete W/ Cont if Necessary Per Protocol   5. SOB (shortness of breath) on exertion  Adult Transthoracic Echo Complete W/ Cont if Necessary Per Protocol    Treadmill Stress Test   6. Chest pain on exertion  Treadmill Stress Test        Orders Placed This Encounter   Procedures   • Treadmill Stress Test     Paroxysmal atrial fibrillation. She prefers to try to walk.     Standing Status:   Future     Standing Expiration Date:   6/25/2019     Order Specific Question:   What stress agent will be used?     Answer:   Exercise with possible pharmacologic     Order Specific Question:   Reason for Exam:     Answer:   chest tightness with exertion   • ECG 12 Lead     This order was created via procedure documentation   • Adult Transthoracic Echo Complete W/ Cont if Necessary Per Protocol      Standing Status:   Future     Standing Expiration Date:   6/25/2019     Order Specific Question:   Reason for exam?     Answer:   Arrhythmia     Order Specific Question:   Arrhythmias specification?     Answer:   Palpitations     Order Specific Question:   Arrhythmias specification?     Answer:   AFib         Plan:     1.  History of atrial fibrillation diagnosed in July 2015.  She returned to sinus rhythm after IV Cardizem and wore 30 day event monitor which showed no events and was taken off anticoagulation.  She then presented in May 2018 with a few days complaint of palpitations and was found to be in atrial flutter with 2-1 block.  She was given adenosine and IV metoprolol and converted to sinus rhythm. She remains in normal rhythm today. Not had an echocardiogram in3 years.  We'll get repeat study considering current of atrial fibrillation/flutter.    Atrial Fibrillation and Atrial Flutter  Assessment  • The patient has paroxysmal atrial fibrillation and paroxysmal atrial flutter  • This is non-valvular in etiology  • The patient's CHADS2-VASc score is 2  • A [id number] score of 2 or more is considered a high risk for a thromboembolic event  • Rivaroxaban prescribed    Plan  • Attempt to maintain sinus rhythm  • Continue apixaban for antithrombotic therapy, bleeding issues discussed  • Continue diltiazem for rhythm control  • Metoprolol PRN for palpitations lasting >30 min    2.  Hyperlipidemia not on therapy per PCP  3.  Vertigo.  She has stopped taking meclizine Epley Maneuvers were performed and she has had improved symptoms.  4.  She takes Macrodantin daily to avoid urinary tract infection.  She has had manipulation of the bladder in the past and is very prone to infection.  5.  History of plantar fasciitis tolerating routine walking.  6. Chest tightness with exertion.  In the past it was suspected that this was related to indigestion.  She requested a treadmill stress test to be sure that there is  nothing worrisome.  We will perform a treadmill stress test to rule out ischemic cause.      Follow up in 6 weeks with Dr. Connell    Patient was instructed to call the office if new symptoms develop or report to nearest ER if heart attack or stroke is suspected.        It has been a pleasure to participate in this patient's care.      Thank you,  ORACIO Cano      **Papi Disclaimer:**  Much of this encounter note is an electronic transcription/translation of spoken language to printed text. The electronic translation of spoken language may permit erroneous, or at times, nonsensical words or phrases to be inadvertently transcribed. Although I have reviewed the note for such errors, some may still exist.

## 2018-07-09 ENCOUNTER — HOSPITAL ENCOUNTER (OUTPATIENT)
Dept: CARDIOLOGY | Facility: HOSPITAL | Age: 73
Discharge: HOME | End: 2018-07-09

## 2018-07-09 ENCOUNTER — HOSPITAL ENCOUNTER (OUTPATIENT)
Dept: CARDIOLOGY | Facility: HOSPITAL | Age: 73
Discharge: HOME | End: 2018-07-09
Admitting: NURSE PRACTITIONER

## 2018-07-09 VITALS
DIASTOLIC BLOOD PRESSURE: 78 MMHG | BODY MASS INDEX: 24.17 KG/M2 | HEIGHT: 67 IN | HEART RATE: 73 BPM | WEIGHT: 154 LBS | SYSTOLIC BLOOD PRESSURE: 120 MMHG

## 2018-07-09 DIAGNOSIS — R06.02 SOB (SHORTNESS OF BREATH) ON EXERTION: ICD-10-CM

## 2018-07-09 DIAGNOSIS — R07.9 CHEST PAIN ON EXERTION: ICD-10-CM

## 2018-07-09 DIAGNOSIS — I48.0 PAROXYSMAL ATRIAL FIBRILLATION (HCC): ICD-10-CM

## 2018-07-09 DIAGNOSIS — I48.92 ATRIAL FLUTTER BY ELECTROCARDIOGRAM (HCC): ICD-10-CM

## 2018-07-09 LAB
ASCENDING AORTA: 3.4 CM
BH CV ECHO MEAS - ACS: 1.8 CM
BH CV ECHO MEAS - AO MAX PG (FULL): 1.2 MMHG
BH CV ECHO MEAS - AO MAX PG: 4.6 MMHG
BH CV ECHO MEAS - AO MEAN PG (FULL): 0.61 MMHG
BH CV ECHO MEAS - AO MEAN PG: 2.7 MMHG
BH CV ECHO MEAS - AO ROOT AREA (BSA CORRECTED): 1.9
BH CV ECHO MEAS - AO ROOT AREA: 9.1 CM^2
BH CV ECHO MEAS - AO ROOT DIAM: 3.4 CM
BH CV ECHO MEAS - AO V2 MAX: 107.2 CM/SEC
BH CV ECHO MEAS - AO V2 MEAN: 77.7 CM/SEC
BH CV ECHO MEAS - AO V2 VTI: 27.1 CM
BH CV ECHO MEAS - AVA(I,A): 2.6 CM^2
BH CV ECHO MEAS - AVA(I,D): 2.6 CM^2
BH CV ECHO MEAS - AVA(V,A): 2.7 CM^2
BH CV ECHO MEAS - AVA(V,D): 2.7 CM^2
BH CV ECHO MEAS - BSA(HAYCOCK): 1.8 M^2
BH CV ECHO MEAS - BSA: 1.8 M^2
BH CV ECHO MEAS - BZI_BMI: 24.1 KILOGRAMS/M^2
BH CV ECHO MEAS - BZI_METRIC_HEIGHT: 170.2 CM
BH CV ECHO MEAS - BZI_METRIC_WEIGHT: 69.9 KG
BH CV ECHO MEAS - CONTRAST EF (2CH): 57.6 ML/M^2
BH CV ECHO MEAS - CONTRAST EF 4CH: 64.3 ML/M^2
BH CV ECHO MEAS - EDV(MOD-SP2): 85 ML
BH CV ECHO MEAS - EDV(MOD-SP4): 84 ML
BH CV ECHO MEAS - EDV(TEICH): 138.7 ML
BH CV ECHO MEAS - EF(CUBED): 72.3 %
BH CV ECHO MEAS - EF(MOD-BP): 62 %
BH CV ECHO MEAS - EF(MOD-SP2): 57.6 %
BH CV ECHO MEAS - EF(MOD-SP4): 64.3 %
BH CV ECHO MEAS - EF(TEICH): 63.5 %
BH CV ECHO MEAS - ESV(MOD-SP2): 36 ML
BH CV ECHO MEAS - ESV(MOD-SP4): 30 ML
BH CV ECHO MEAS - ESV(TEICH): 50.6 ML
BH CV ECHO MEAS - FS: 34.8 %
BH CV ECHO MEAS - IVS/LVPW: 1
BH CV ECHO MEAS - IVSD: 0.87 CM
BH CV ECHO MEAS - LAT PEAK E' VEL: 11 CM/SEC
BH CV ECHO MEAS - LV DIASTOLIC VOL/BSA (35-75): 46.4 ML/M^2
BH CV ECHO MEAS - LV MASS(C)D: 168.9 GRAMS
BH CV ECHO MEAS - LV MASS(C)DI: 93.3 GRAMS/M^2
BH CV ECHO MEAS - LV MAX PG: 3.4 MMHG
BH CV ECHO MEAS - LV MEAN PG: 2.1 MMHG
BH CV ECHO MEAS - LV SYSTOLIC VOL/BSA (12-30): 16.6 ML/M^2
BH CV ECHO MEAS - LV V1 MAX: 92.6 CM/SEC
BH CV ECHO MEAS - LV V1 MEAN: 68.5 CM/SEC
BH CV ECHO MEAS - LV V1 VTI: 22.4 CM
BH CV ECHO MEAS - LVIDD: 5.4 CM
BH CV ECHO MEAS - LVIDS: 3.5 CM
BH CV ECHO MEAS - LVLD AP2: 7.4 CM
BH CV ECHO MEAS - LVLD AP4: 7.5 CM
BH CV ECHO MEAS - LVLS AP2: 6.6 CM
BH CV ECHO MEAS - LVLS AP4: 6 CM
BH CV ECHO MEAS - LVOT AREA (M): 3.1 CM^2
BH CV ECHO MEAS - LVOT AREA: 3.2 CM^2
BH CV ECHO MEAS - LVOT DIAM: 2 CM
BH CV ECHO MEAS - LVPWD: 0.87 CM
BH CV ECHO MEAS - MED PEAK E' VEL: 11 CM/SEC
BH CV ECHO MEAS - MR MAX PG: 72 MMHG
BH CV ECHO MEAS - MR MAX VEL: 424.3 CM/SEC
BH CV ECHO MEAS - MV A DUR: 0.11 SEC
BH CV ECHO MEAS - MV A MAX VEL: 90.2 CM/SEC
BH CV ECHO MEAS - MV DEC SLOPE: 300 CM/SEC^2
BH CV ECHO MEAS - MV DEC TIME: 0.2 SEC
BH CV ECHO MEAS - MV E MAX VEL: 70.3 CM/SEC
BH CV ECHO MEAS - MV E/A: 0.78
BH CV ECHO MEAS - MV MAX PG: 3.3 MMHG
BH CV ECHO MEAS - MV MEAN PG: 1.5 MMHG
BH CV ECHO MEAS - MV P1/2T MAX VEL: 70.3 CM/SEC
BH CV ECHO MEAS - MV P1/2T: 68.7 MSEC
BH CV ECHO MEAS - MV V2 MAX: 90.9 CM/SEC
BH CV ECHO MEAS - MV V2 MEAN: 57.3 CM/SEC
BH CV ECHO MEAS - MV V2 VTI: 27 CM
BH CV ECHO MEAS - MVA P1/2T LCG: 3.1 CM^2
BH CV ECHO MEAS - MVA(P1/2T): 3.2 CM^2
BH CV ECHO MEAS - MVA(VTI): 2.6 CM^2
BH CV ECHO MEAS - PA ACC TIME: 0.14 SEC
BH CV ECHO MEAS - PA MAX PG (FULL): 2.2 MMHG
BH CV ECHO MEAS - PA MAX PG: 4.4 MMHG
BH CV ECHO MEAS - PA PR(ACCEL): 17.2 MMHG
BH CV ECHO MEAS - PA V2 MAX: 104.5 CM/SEC
BH CV ECHO MEAS - PULM A REVS DUR: 0.1 SEC
BH CV ECHO MEAS - PULM A REVS VEL: 30.1 CM/SEC
BH CV ECHO MEAS - PULM DIAS VEL: 40.3 CM/SEC
BH CV ECHO MEAS - PULM S/D: 1.3
BH CV ECHO MEAS - PULM SYS VEL: 51.4 CM/SEC
BH CV ECHO MEAS - PVA(V,A): 2.2 CM^2
BH CV ECHO MEAS - PVA(V,D): 2.2 CM^2
BH CV ECHO MEAS - QP/QS: 0.71
BH CV ECHO MEAS - RAP SYSTOLE: 3 MMHG
BH CV ECHO MEAS - RV MAX PG: 2.2 MMHG
BH CV ECHO MEAS - RV MEAN PG: 1.2 MMHG
BH CV ECHO MEAS - RV V1 MAX: 74.2 CM/SEC
BH CV ECHO MEAS - RV V1 MEAN: 52.4 CM/SEC
BH CV ECHO MEAS - RV V1 VTI: 16.2 CM
BH CV ECHO MEAS - RVOT AREA: 3.1 CM^2
BH CV ECHO MEAS - RVOT DIAM: 2 CM
BH CV ECHO MEAS - SI(AO): 135.9 ML/M^2
BH CV ECHO MEAS - SI(CUBED): 61.4 ML/M^2
BH CV ECHO MEAS - SI(LVOT): 39.3 ML/M^2
BH CV ECHO MEAS - SI(MOD-SP2): 27.1 ML/M^2
BH CV ECHO MEAS - SI(MOD-SP4): 29.8 ML/M^2
BH CV ECHO MEAS - SI(TEICH): 48.7 ML/M^2
BH CV ECHO MEAS - SUP REN AO DIAM: 2.3 CM
BH CV ECHO MEAS - SV(AO): 245.9 ML
BH CV ECHO MEAS - SV(CUBED): 111.1 ML
BH CV ECHO MEAS - SV(LVOT): 71.2 ML
BH CV ECHO MEAS - SV(MOD-SP2): 49 ML
BH CV ECHO MEAS - SV(MOD-SP4): 54 ML
BH CV ECHO MEAS - SV(RVOT): 50.5 ML
BH CV ECHO MEAS - SV(TEICH): 88.1 ML
BH CV ECHO MEAS - TAPSE (>1.6): 2 CM2
BH CV ECHO MEAS - TR MAX VEL: 123.5 CM/SEC
BH CV ECHO MEASUREMENTS AVERAGE E/E' RATIO: 6.39
BH CV STRESS BP STAGE 1: NORMAL
BH CV STRESS BP STAGE 2: NORMAL
BH CV STRESS BP STAGE 3: NORMAL
BH CV STRESS DURATION MIN STAGE 1: 3
BH CV STRESS DURATION MIN STAGE 2: 3
BH CV STRESS DURATION MIN STAGE 3: 3
BH CV STRESS DURATION SEC STAGE 1: 0
BH CV STRESS DURATION SEC STAGE 2: 0
BH CV STRESS DURATION SEC STAGE 3: 0
BH CV STRESS GRADE STAGE 1: 10
BH CV STRESS GRADE STAGE 2: 12
BH CV STRESS GRADE STAGE 3: 14
BH CV STRESS HR STAGE 1: 95
BH CV STRESS HR STAGE 2: 112
BH CV STRESS HR STAGE 3: 131
BH CV STRESS METS STAGE 1: 5
BH CV STRESS METS STAGE 2: 7.5
BH CV STRESS METS STAGE 3: 10
BH CV STRESS PROTOCOL 1: NORMAL
BH CV STRESS RECOVERY BP: NORMAL MMHG
BH CV STRESS RECOVERY HR: 79 BPM
BH CV STRESS SPEED STAGE 1: 1.7
BH CV STRESS SPEED STAGE 2: 2.5
BH CV STRESS SPEED STAGE 3: 3.4
BH CV STRESS STAGE 1: 1
BH CV STRESS STAGE 2: 2
BH CV STRESS STAGE 3: 3
BH CV XLRA - RV BASE: 2.9 CM
BH CV XLRA - TDI S': 13 CM/SEC
LEFT ATRIUM VOLUME INDEX: 21 ML/M2
LV EF 2D ECHO EST: 62 %
MAXIMAL PREDICTED HEART RATE: 149 BPM
PERCENT MAX PREDICTED HR: 87.92 %
SINUS: 3 CM
STJ: 2.8 CM
STRESS BASELINE BP: NORMAL MMHG
STRESS BASELINE HR: 66 BPM
STRESS PERCENT HR: 103 %
STRESS POST ESTIMATED WORKLOAD: 10 METS
STRESS POST EXERCISE DUR MIN: 9 MIN
STRESS POST EXERCISE DUR SEC: 0 SEC
STRESS POST PEAK BP: NORMAL MMHG
STRESS POST PEAK HR: 131 BPM
STRESS TARGET HR: 127 BPM

## 2018-07-09 PROCEDURE — 93016 CV STRESS TEST SUPVJ ONLY: CPT | Performed by: INTERNAL MEDICINE

## 2018-07-09 PROCEDURE — 93306 TTE W/DOPPLER COMPLETE: CPT

## 2018-07-09 PROCEDURE — 93017 CV STRESS TEST TRACING ONLY: CPT

## 2018-07-09 PROCEDURE — 93018 CV STRESS TEST I&R ONLY: CPT | Performed by: INTERNAL MEDICINE

## 2018-07-09 PROCEDURE — 93306 TTE W/DOPPLER COMPLETE: CPT | Performed by: INTERNAL MEDICINE

## 2018-08-24 ENCOUNTER — OFFICE VISIT (OUTPATIENT)
Dept: CARDIOLOGY | Facility: CLINIC | Age: 73
End: 2018-08-24

## 2018-08-24 VITALS
WEIGHT: 155 LBS | BODY MASS INDEX: 24.33 KG/M2 | HEIGHT: 67 IN | SYSTOLIC BLOOD PRESSURE: 128 MMHG | DIASTOLIC BLOOD PRESSURE: 74 MMHG | HEART RATE: 64 BPM

## 2018-08-24 DIAGNOSIS — R07.2 PRECORDIAL PAIN: ICD-10-CM

## 2018-08-24 DIAGNOSIS — R42 VERTIGO: ICD-10-CM

## 2018-08-24 DIAGNOSIS — I48.0 PAROXYSMAL ATRIAL FIBRILLATION (HCC): Primary | ICD-10-CM

## 2018-08-24 PROCEDURE — 99214 OFFICE O/P EST MOD 30 MIN: CPT | Performed by: INTERNAL MEDICINE

## 2018-08-24 RX ORDER — SULFAMETHOXAZOLE AND TRIMETHOPRIM 400; 80 MG/1; MG/1
TABLET ORAL
COMMUNITY
Start: 2018-08-19 | End: 2019-08-28

## 2018-08-24 NOTE — PROGRESS NOTES
Date of Office Visit: 18  Encounter Provider: Deepak Connell MD  Place of Service: [facility] CARDIOLOGY  Patient Name: Rebeca De Souza  :1945  Referral Provider:Deepak Connell MD      No chief complaint on file.    History of Present Illness  The patient is a pleasant73-year-old female who has had an extensive cardiovascular  evaluation who had chest discomfort and multiple negative stress tests and then, in ,  had a cardiac catheterization at [facility] that was negative. She then presented  with three days of intermittent heart racing and chest pain and pressure. She presented  to [facility] on 2015, and was found to be in atrial fibrillation. She went back  into sinus rhythm on IV Cardizem and we started her on oral Cardizem. She had an  echocardiogram that showed normal left ventricular systolic function, normal saline  contrast study, and no significant valvular disease. She then wore a 30 day auto trigger atrial fibrillation event monitor.  She had no events.  So we stopped her Xarelto.  Then she had an episode in May 2018 where she was in atrial fibrillation/flutter went to the emergency room.  She was placed back on Xarelto and interestingly again in 2018 check another episode of atrial fibrillation .In 2018 she is having some atypical chest tightness had a normal ECG stress test and echocardiogram with normal LV function.  She comes in for follow-up. The patient denies chest pain, pressure and heaviness. No shortness of breath, othopnea or PND. No palpitations, near syncope or syncope. No stroke type symptoms like paralysis, paresthesia, abrupt vision loss and dysarthria. No bleeding like blood in the stool or dark stools.  She's also had some vertigo around that time in  and was seen ENT in getting manipulation of Brilinta crystals.      Atrial Fibrillation   Symptoms are negative for dizziness and weakness. Past  medical history includes atrial fibrillation.         Past Medical History:   Diagnosis Date   • Atrial fibrillation (CMS/HCC)     history on med   • Blood in stool    • Chest pain     history   • Dizziness    • Fracture of radius     fall 12/27/16   • Frequent UTI     on prophylactic poab   • GERD (gastroesophageal reflux disease)    • Hemorrhoid    • Hypercalcemia    • IBS (irritable bowel syndrome)    • PAF (paroxysmal atrial fibrillation) (CMS/HCC)    • Parathyroid disorder (CMS/HCC)     MD monitoring   • Vertigo    • Wrist pain     left          Past Surgical History:   Procedure Laterality Date   • BLADDER SURGERY      tvt sling   • CARDIAC CATHETERIZATION     • COLONOSCOPY  10/23/2015    Non-thrombosed EH, Toruous colon, Diverticulosis, IH    • COLONOSCOPY  08/17/2010    Non-thrombosed EH, Diverticulosis, Tortuous colon, IH    • COLONOSCOPY  05/14/2007    Non thrombosed EH, Tortuous colon, One non bleeding polyp   • COLONOSCOPY N/A 5/2/2017    Procedure: COLONOSCOPY TO CECUM AND TI WITH COLD BIOPSIES POLYPECTOMY;  Surgeon: Deepak MARTINES MD;  Location: [facility] ENDOSCOPY;  Service:    • ENDOSCOPY N/A 5/2/2017    Procedure: ESOPHAGOGASTRODUODENOSCOPY  WITH COLD BIOPSY;  Surgeon: Deepak MARTINES MD;  Location: [facility] ENDOSCOPY;  Service:    • GALLBLADDER SURGERY     • HYSTERECTOMY      removed tubes & rt ovary also   • OOPHORECTOMY Left    • ORIF WRIST FRACTURE Left 12/30/2016    Procedure:  LT DISTAL RADIUS OPEN REDUCTION INTERNAL FIXATION;  Surgeon: Shawn Dorsey MD;  Location: [facility] MAIN OR;  Service:    • TONSILLECTOMY     • TONSILLECTOMY     • VARICOSE VEIN SURGERY      used wire         Current Outpatient Prescriptions on File Prior to Visit   Medication Sig Dispense Refill   • diltiaZEM CD (CARDIZEM CD) 360 MG 24 hr capsule Take 1 capsule by mouth Daily. 30 capsule 5   • esomeprazole (nexIUM) 40 MG capsule Take 1 capsule by mouth Daily. (Patient taking differently: Take 40 mg by mouth Daily As  Needed.) 30 capsule 11   • estradiol (VAGIFEM) 10 MCG tablet vaginal tablet Insert 1 tablet into the vagina 1 (One) Time Per Week.     • fluticasone (FLONASE) 50 MCG/ACT nasal spray 1 spray into each nostril Daily.     • Glucosamine-MSM-Hyaluronic Acd (JOINT HEALTH PO) Take  by mouth Daily.     • meclizine (ANTIVERT) 25 MG tablet Take 1 tablet by mouth Every 6 (Six) Hours As Needed for dizziness. 20 tablet 0   • metoprolol tartrate (LOPRESSOR) 25 MG tablet Take up to twice daily as needed for palpitations. 60 tablet 5   • nitrofurantoin (MACRODANTIN) 50 MG capsule Take 50 mg by mouth Every Morning.     • rivaroxaban (XARELTO) 20 MG tablet Take 1 tablet by mouth Daily. 30 tablet 5     No current facility-administered medications on file prior to visit.          Social History     Social History   • Marital status:      Spouse name: N/A   • Number of children: N/A   • Years of education: N/A     Occupational History   • Not on file.     Social History Main Topics   • Smoking status: Never Smoker   • Smokeless tobacco: Never Used      Comment: caffeine use    • Alcohol use Yes      Comment: rarely   • Drug use: No      Comment: prescribed by MD   • Sexual activity: Defer     Other Topics Concern   • Not on file     Social History Narrative   • No narrative on file       Family History   Problem Relation Age of Onset   • Hypertension Mother    • Heart attack Father    • Hypertension Father    • Stroke Paternal Grandmother    • Pancreatic cancer Brother    • Diabetes Brother    • Colon cancer Maternal Uncle    • Prostate cancer Brother    • Diabetes Brother          Review of Systems   Constitution: Negative for decreased appetite, diaphoresis, fever, weakness, malaise/fatigue, weight gain and weight loss.   HENT: Negative for congestion, hearing loss, nosebleeds and tinnitus.    Eyes: Negative for blurred vision, double vision, vision loss in left eye, vision loss in right eye and visual disturbance.    Cardiovascular:        As noted in HPI   Respiratory:        As noted HPI   Endocrine: Negative for cold intolerance and heat intolerance.   Hematologic/Lymphatic: Negative for bleeding problem. Does not bruise/bleed easily.   Skin: Negative for color change, flushing, itching and rash.   Musculoskeletal: Negative for arthritis, back pain, joint pain, joint swelling, muscle weakness and myalgias.   Gastrointestinal: Negative for bloating, abdominal pain, constipation, diarrhea, dysphagia, heartburn, hematemesis, hematochezia, melena, nausea and vomiting.   Genitourinary: Negative for bladder incontinence, dysuria, frequency, nocturia and urgency.   Neurological: Negative for dizziness, focal weakness, headaches, light-headedness, loss of balance, numbness, paresthesias and vertigo.   Psychiatric/Behavioral: Negative for depression, memory loss and substance abuse.       Procedures    Procedures        Objective:    There were no vitals taken for this visit.       Physical Exam  Physical Exam   Constitutional: She is oriented to person, place, and time. She appears well-developed and well-nourished. No distress.   HENT:   Head: Normocephalic.   Eyes: Pupils are equal, round, and reactive to light. Conjunctivae are normal. No scleral icterus.   Neck: Normal carotid pulses, no hepatojugular reflux and no JVD present. Carotid bruit is not present. No tracheal deviation, no edema and no erythema present. No thyromegaly present.   Cardiovascular: Normal rate, regular rhythm, S1 normal, S2 normal, normal heart sounds and intact distal pulses.   No extrasystoles are present. PMI is not displaced.  Exam reveals no gallop, no distant heart sounds and no friction rub.    No murmur heard.  Pulses:       Carotid pulses are 2+ on the right side, and 2+ on the left side.       Radial pulses are 2+ on the right side, and 2+ on the left side.        Femoral pulses are 2+ on the right side, and 2+ on the left side.       Dorsalis  pedis pulses are 2+ on the right side, and 2+ on the left side.        Posterior tibial pulses are 2+ on the right side, and 2+ on the left side.   Pulmonary/Chest: Effort normal and breath sounds normal. No respiratory distress. She has no decreased breath sounds. She has no wheezes. She has no rhonchi. She has no rales. She exhibits no tenderness.   Abdominal: Soft. Bowel sounds are normal. She exhibits no distension and no mass. There is no hepatosplenomegaly. There is no tenderness. There is no rebound and no guarding.   Musculoskeletal: She exhibits no edema, tenderness or deformity.   Neurological: She is alert and oriented to person, place, and time.   Skin: Skin is warm and dry. No rash noted. She is not diaphoretic. No cyanosis or erythema. No pallor. Nails show no clubbing.   Psychiatric: She has a normal mood and affect. Her speech is normal and behavior is normal. Judgment and thought content normal.           Assessment:   1. This is a 73-year-old female with paroxysmal atrial fibrillation, [id number] score of two, which puts her at high risk of embolic event. Normal 30 day atrial fibrillation event monitor taken off anticoagulation.  Then recurrent atrial fibrillation/flutter May and June 2018.  Atrial Fibrillation and Atrial Flutter  Assessment  • The patient has paroxysmal atrial fibrillation and paroxysmal atrial flutter  • This is non-valvular in etiology  • The patient's CHADS2-VASc score is 2  • A [id number] score of 2 or more is considered a high risk for a thromboembolic event  • Rivaroxaban prescribed    Plan  • Attempt to maintain sinus rhythm  • Continue rivaroxaban for antithrombotic therapy, bleeding issues discussed  • Continue diltiazem for rhythm control  • Metoprolol PRN for palpitations lasting >30 min  She's now doing well she is to continue the same will see us again in follow-up in one year.     2. History of chest pain. Negative cardiovascular workup.  Including repeat negative  cardiovascular workup with normal echocardiogram and ECG stress test in June 2018.  3.  Vertigo treated with realigning of her crystals.  An stable.         Plan:

## 2018-12-03 RX ORDER — DILTIAZEM HYDROCHLORIDE 360 MG/1
360 CAPSULE, EXTENDED RELEASE ORAL DAILY
Qty: 30 CAPSULE | Refills: 5 | Status: SHIPPED | OUTPATIENT
Start: 2018-12-03 | End: 2019-05-06 | Stop reason: SDUPTHER

## 2019-05-06 RX ORDER — DILTIAZEM HYDROCHLORIDE 360 MG/1
CAPSULE, EXTENDED RELEASE ORAL
Qty: 30 CAPSULE | Refills: 3 | Status: SHIPPED | OUTPATIENT
Start: 2019-05-06 | End: 2019-08-05 | Stop reason: SDUPTHER

## 2019-08-05 RX ORDER — DILTIAZEM HYDROCHLORIDE 360 MG/1
360 CAPSULE, EXTENDED RELEASE ORAL DAILY
Qty: 30 CAPSULE | Refills: 5 | Status: SHIPPED | OUTPATIENT
Start: 2019-08-05 | End: 2020-01-29

## 2019-08-28 ENCOUNTER — OFFICE VISIT (OUTPATIENT)
Dept: CARDIOLOGY | Facility: CLINIC | Age: 74
End: 2019-08-28

## 2019-08-28 VITALS
RESPIRATION RATE: 16 BRPM | DIASTOLIC BLOOD PRESSURE: 80 MMHG | SYSTOLIC BLOOD PRESSURE: 150 MMHG | HEIGHT: 66 IN | HEART RATE: 69 BPM | BODY MASS INDEX: 24.91 KG/M2 | WEIGHT: 155 LBS

## 2019-08-28 DIAGNOSIS — I48.0 PAROXYSMAL ATRIAL FIBRILLATION (HCC): Primary | ICD-10-CM

## 2019-08-28 DIAGNOSIS — E78.2 MIXED HYPERLIPIDEMIA: ICD-10-CM

## 2019-08-28 PROCEDURE — 99214 OFFICE O/P EST MOD 30 MIN: CPT | Performed by: INTERNAL MEDICINE

## 2019-08-28 PROCEDURE — 93000 ELECTROCARDIOGRAM COMPLETE: CPT | Performed by: INTERNAL MEDICINE

## 2019-08-28 RX ORDER — NITROFURANTOIN MACROCRYSTALS 50 MG/1
CAPSULE ORAL
COMMUNITY
Start: 2019-06-10 | End: 2020-04-20

## 2019-08-28 NOTE — PROGRESS NOTES
Date of Office Visit: 19  Encounter Provider: Deepak Connell MD  Place of Service: [facility] CARDIOLOGY  Patient Name: Rebeca De Souza  :1945  Referral Provider:Referring, Self      No chief complaint on file.    History of Present Illness  The patient is a pleasant 74-year-old female who has had an extensive cardiovascular  evaluation who had chest discomfort and multiple negative stress tests and then, in ,  had a cardiac catheterization at [facility] that was negative. She then presented  with three days of intermittent heart racing and chest pain and pressure. She presented  to [facility] on 2015, and was found to be in atrial fibrillation. She went back  into sinus rhythm on IV Cardizem and we started her on oral Cardizem. She had an  echocardiogram that showed normal left ventricular systolic function, normal saline  contrast study, and no significant valvular disease. She then wore a 30 day auto trigger atrial fibrillation event monitor.  She had no events.  So we stopped her Xarelto.  Then she had an episode in May 2018 where she was in atrial fibrillation/flutter went to the emergency room.  She was placed back on Xarelto and interestingly again in 2018 check another episode of atrial fibrillation .In 2018 she is having some atypical chest tightness had a normal ECG stress test and echocardiogram with normal LV function.  She comes in for follow-up.  Fortunately she is having increased episodes of metoprolol.  In in  she had an episode that awoke her in the morning lasted about an hour and a half did have shortness of breath and chest discomfort with that she just started some Myrbetriq so she stopped that but then again in July and August had similar episodes waking her up at night beyond that no other symptoms of exertional pain or pressure no orthopnea or PND no near syncope or syncope.  But these episodes do frightened  her.      Atrial Fibrillation   Symptoms are negative for dizziness and weakness. Past medical history includes atrial fibrillation.         Past Medical History:   Diagnosis Date   • Atrial fibrillation (CMS/HCC)     history on med   • Blood in stool    • Chest pain     history   • Dizziness    • Fracture of radius     fall 12/27/16   • Frequent UTI     on prophylactic poab   • GERD (gastroesophageal reflux disease)    • Hemorrhoid    • Hypercalcemia    • IBS (irritable bowel syndrome)    • PAF (paroxysmal atrial fibrillation) (CMS/HCC)    • Parathyroid disorder (CMS/HCC)     MD monitoring   • Vertigo    • Wrist pain     left          Past Surgical History:   Procedure Laterality Date   • BLADDER SURGERY      tvt sling   • CARDIAC CATHETERIZATION     • COLONOSCOPY  10/23/2015    Non-thrombosed EH, Toruous colon, Diverticulosis, IH    • COLONOSCOPY  08/17/2010    Non-thrombosed EH, Diverticulosis, Tortuous colon, IH    • COLONOSCOPY  05/14/2007    Non thrombosed EH, Tortuous colon, One non bleeding polyp   • COLONOSCOPY N/A 5/2/2017    Procedure: COLONOSCOPY TO CECUM AND TI WITH COLD BIOPSIES POLYPECTOMY;  Surgeon: Deepak MARTINES MD;  Location: [facility] ENDOSCOPY;  Service:    • ENDOSCOPY N/A 5/2/2017    Procedure: ESOPHAGOGASTRODUODENOSCOPY  WITH COLD BIOPSY;  Surgeon: Deepak MARTINES MD;  Location: [facility] ENDOSCOPY;  Service:    • GALLBLADDER SURGERY     • HYSTERECTOMY      removed tubes & rt ovary also   • OOPHORECTOMY Left    • ORIF WRIST FRACTURE Left 12/30/2016    Procedure:  LT DISTAL RADIUS OPEN REDUCTION INTERNAL FIXATION;  Surgeon: Shawn Dorsey MD;  Location: [facility] MAIN OR;  Service:    • TONSILLECTOMY     • TONSILLECTOMY     • VARICOSE VEIN SURGERY      used wire         Current Outpatient Medications on File Prior to Visit   Medication Sig Dispense Refill   • diltiaZEM CD (CARDIZEM CD) 360 MG 24 hr capsule Take 1 capsule by mouth Daily. 30 capsule 5   • esomeprazole (nexIUM) 40 MG capsule Take  1 capsule by mouth Daily. (Patient taking differently: Take 40 mg by mouth Daily As Needed.) 30 capsule 11   • estradiol (VAGIFEM) 10 MCG tablet vaginal tablet Insert 1 tablet into the vagina 1 (One) Time Per Week.     • metoprolol tartrate (LOPRESSOR) 25 MG tablet Take up to twice daily as needed for palpitations. 60 tablet 5   • nitrofurantoin (MACRODANTIN) 50 MG capsule      • rivaroxaban (XARELTO) 20 MG tablet Take 1 tablet by mouth Daily. 90 tablet 3   • [DISCONTINUED] meclizine (ANTIVERT) 25 MG tablet Take 1 tablet by mouth Every 6 (Six) Hours As Needed for dizziness. 20 tablet 0   • [DISCONTINUED] sulfamethoxazole-trimethoprim (BACTRIM,SEPTRA) 400-80 MG tablet        No current facility-administered medications on file prior to visit.          Social History     Socioeconomic History   • Marital status:      Spouse name: Not on file   • Number of children: Not on file   • Years of education: Not on file   • Highest education level: Not on file   Tobacco Use   • Smoking status: Never Smoker   • Smokeless tobacco: Never Used   • Tobacco comment: caffeine use    Substance and Sexual Activity   • Alcohol use: Yes     Comment: rarely   • Drug use: No     Types: Hydrocodone     Comment: prescribed by MD   • Sexual activity: Defer       Family History   Problem Relation Age of Onset   • Hypertension Mother    • Heart attack Father    • Hypertension Father    • Stroke Paternal Grandmother    • Pancreatic cancer Brother    • Diabetes Brother    • Colon cancer Maternal Uncle    • Prostate cancer Brother    • Diabetes Brother          Review of Systems   Constitution: Negative for decreased appetite, diaphoresis, fever, weakness, malaise/fatigue, weight gain and weight loss.   HENT: Negative for congestion, hearing loss, nosebleeds and tinnitus.    Eyes: Negative for blurred vision, double vision, vision loss in left eye, vision loss in right eye and visual disturbance.   Cardiovascular:        As noted in HPI  "  Respiratory:        As noted HPI   Endocrine: Negative for cold intolerance and heat intolerance.   Hematologic/Lymphatic: Negative for bleeding problem. Does not bruise/bleed easily.   Skin: Negative for color change, flushing, itching and rash.   Musculoskeletal: Negative for arthritis, back pain, joint pain, joint swelling, muscle weakness and myalgias.   Gastrointestinal: Negative for bloating, abdominal pain, constipation, diarrhea, dysphagia, heartburn, hematemesis, hematochezia, melena, nausea and vomiting.   Genitourinary: Negative for bladder incontinence, dysuria, frequency, nocturia and urgency.   Neurological: Negative for dizziness, focal weakness, headaches, light-headedness, loss of balance, numbness, paresthesias and vertigo.   Psychiatric/Behavioral: Negative for depression, memory loss and substance abuse.       Procedures      ECG 12 Lead  Date/Time: 8/28/2019 1:01 PM  Performed by: Deepak Connell MD  Authorized by: Deepak Connell MD   Comparison: compared with previous ECG   Rhythm: sinus rhythm  Rate: normal  QRS axis: normal                  Objective:    /80 (BP Location: Left arm, Patient Position: Sitting, Cuff Size: Adult)   Pulse 69   Resp 16   Ht 167.6 cm (66\")   Wt 70.3 kg (155 lb)   BMI 25.02 kg/m²        Physical Exam  Physical Exam   Constitutional: She is oriented to person, place, and time. She appears well-developed and well-nourished. No distress.   HENT:   Head: Normocephalic.   Eyes: Conjunctivae are normal. Pupils are equal, round, and reactive to light. No scleral icterus.   Neck: Normal carotid pulses, no hepatojugular reflux and no JVD present. Carotid bruit is not present. No tracheal deviation, no edema and no erythema present. No thyromegaly present.   Cardiovascular: Normal rate, regular rhythm, S1 normal, S2 normal, normal heart sounds and intact distal pulses.  No extrasystoles are present. PMI is not displaced. Exam reveals no gallop, no distant " heart sounds and no friction rub.   No murmur heard.  Pulses:       Carotid pulses are 2+ on the right side, and 2+ on the left side.       Radial pulses are 2+ on the right side, and 2+ on the left side.        Femoral pulses are 2+ on the right side, and 2+ on the left side.       Dorsalis pedis pulses are 2+ on the right side, and 2+ on the left side.        Posterior tibial pulses are 2+ on the right side, and 2+ on the left side.   Pulmonary/Chest: Effort normal and breath sounds normal. No respiratory distress. She has no decreased breath sounds. She has no wheezes. She has no rhonchi. She has no rales. She exhibits no tenderness.   Abdominal: Soft. Bowel sounds are normal. She exhibits no distension and no mass. There is no hepatosplenomegaly. There is no tenderness. There is no rebound and no guarding.   Musculoskeletal: She exhibits no edema, tenderness or deformity.   Neurological: She is alert and oriented to person, place, and time.   Skin: Skin is warm and dry. No rash noted. She is not diaphoretic. No cyanosis or erythema. No pallor. Nails show no clubbing.   Psychiatric: She has a normal mood and affect. Her speech is normal and behavior is normal. Judgment and thought content normal.           Assessment:   1. This is a 73-year-old female with paroxysmal atrial fibrillation, [id number] score of two, which puts her at high risk of embolic event. Then recurrent atrial fibrillation/flutter May and June 2018.  Atrial Fibrillation and Atrial Flutter  Assessment  • The patient has paroxysmal atrial fibrillation and paroxysmal atrial flutter  • This is non-valvular in etiology  • The patient's CHADS2-VASc score is 2  • A [id number] score of 2 or more is considered a high risk for a thromboembolic event  • Rivaroxaban prescribed     Plan  • Attempt to maintain sinus rhythm  • Continue rivaroxaban for antithrombotic therapy, bleeding issues discussed  • Continue diltiazem for rhythm control  • Metoprolol  PRN for palpitations lasting >30 min  Now unfortunate with increased episodes.  We discussed with her still treating these PRN versus taking the metoprolol daily she is good to take 12-1/2 mg of the metoprolol tartrate twice a day will call us back if problems but if stable will see our nurse practitioner in 6 months me in a year.     2. History of chest pain. Negative cardiovascular workup.  Including repeat negative cardiovascular workup with normal echocardiogram and ECG stress test in June 2018.  3.  Vertigo treated with realigning of her crystals.  Again stable.          Plan:

## 2019-11-05 RX ORDER — RIVAROXABAN 20 MG/1
TABLET, FILM COATED ORAL
Qty: 90 TABLET | Refills: 3 | Status: SHIPPED | OUTPATIENT
Start: 2019-11-05 | End: 2020-11-16

## 2020-01-29 RX ORDER — DILTIAZEM HYDROCHLORIDE 360 MG/1
CAPSULE, EXTENDED RELEASE ORAL
Qty: 30 CAPSULE | Refills: 5 | Status: SHIPPED | OUTPATIENT
Start: 2020-01-29 | End: 2020-08-06

## 2020-03-16 ENCOUNTER — TELEPHONE (OUTPATIENT)
Dept: CARDIOLOGY | Facility: CLINIC | Age: 75
End: 2020-03-16

## 2020-03-16 NOTE — TELEPHONE ENCOUNTER
Spoke with patient.  She is feeling well without any new complaints.  She is agreed to postpone her appointment for a month.  She is to call with any questions or concerns.

## 2020-04-07 ENCOUNTER — TELEPHONE (OUTPATIENT)
Dept: CARDIOLOGY | Facility: CLINIC | Age: 75
End: 2020-04-07

## 2020-04-20 ENCOUNTER — TELEMEDICINE (OUTPATIENT)
Dept: CARDIOLOGY | Facility: CLINIC | Age: 75
End: 2020-04-20

## 2020-04-20 VITALS
WEIGHT: 150 LBS | SYSTOLIC BLOOD PRESSURE: 134 MMHG | BODY MASS INDEX: 24.11 KG/M2 | HEIGHT: 66 IN | HEART RATE: 69 BPM | DIASTOLIC BLOOD PRESSURE: 66 MMHG

## 2020-04-20 DIAGNOSIS — I48.92 ATRIAL FLUTTER BY ELECTROCARDIOGRAM (HCC): ICD-10-CM

## 2020-04-20 DIAGNOSIS — E78.2 MIXED HYPERLIPIDEMIA: ICD-10-CM

## 2020-04-20 DIAGNOSIS — R42 VERTIGO: ICD-10-CM

## 2020-04-20 DIAGNOSIS — I48.0 PAROXYSMAL ATRIAL FIBRILLATION (HCC): Primary | ICD-10-CM

## 2020-04-20 PROCEDURE — 99214 OFFICE O/P EST MOD 30 MIN: CPT | Performed by: NURSE PRACTITIONER

## 2020-04-20 RX ORDER — LOVASTATIN 10 MG/1
10 TABLET ORAL
COMMUNITY
Start: 2019-03-26

## 2020-04-20 NOTE — PROGRESS NOTES
"Date of Office Visit: 20  Encounter Provider: ORACIO Cano  Place of Service: [facility] CARDIOLOGY  Patient Name: Rebeca De Souza  :1945    Chief Complaint   Patient presents with   • Atrial Fibrillation   :     HPI: Rebeca De Souza is a 74 y.o. female  with history of Paroxysmal atrial fibrillation, hyperlipidemia, vertigo, and precordial pain.    She is followed by Dr. Connell and I will visit with her in follow up today.     In the past she has had multiple cardiovascular evaluations with negative stress testing.  In , she had some chest pain and had a negative cardiac catheterization performed at [facility].     In 2015 she was found to be in atrial fibrillation.  She converted  with IV Cardizem and was started on oral Cardizem.  She had normal LV function with no significant valvular disease.  She therefore 30 day event monitor which showed no events and she was taken off of anticoagulation.     She had an echocardiogram was in 2015 that showed normal systolic function with an EF of 61%, trace to mild mitral regurgitation, normal RVSP, normal saline study.  There was fluid in the posterior to the ascending aorta measuring 1.3 cm concerning for possible dissection or inflammation.     There was an emergency department visit on 2018 with complaint of palpitations and feeling \"heart jumping around\".  She had some dizziness also.  This is been going on for the past 3 days.  She also complained of upper back pain.  Her heart rate was 134. troponin was negative.  EKG was  atrial flutter with 2-1 conduction.  adenosine was given x2  along with metoprolol IV .  She then converted to sinus rhythm with a heart rate of 59.  Heart diltiazem was increased.  She was discharged in stable condition.  There was another emergency department visit on 2018 with complaint of dizziness.  He reported that dizziness was worse with laying " "flat.  There was no nystagmus.  Heart rate was 88.  There is a CT of the head with no acute intracranial hemorrhage or hydrocephalus.  She was discharged in stable condition with a prescription for Antivert.    In 5//2018,  She reported that occasionally she felt as though the room was spinning.  She also complained of palpitations when she is in atrial fibrillation \"feeling the heart jumping around\".  She was given a when necessary prescription for Lopressor 25 mg twice daily as needed for palpitations.   She later switched to Xarelto from eliquis due to financial issues. She had more issues with veritgo and responded well to Epley Maneuvers. Repeat echo 07/2018 showed normal LVEF, grade 1 diastolic dysfunction and no significant valvular disease. She had an exercise only treadmill stress test for chest tightness complaint with was normal and low risk. She had increased palpitations with Myrbetriq and at that time she used as needed metoprolol and weaned off of it.     We visit today via video. She has been doing well she has no bleeding issues on Xarelto. She denies chest pain tightness pressure, shortness of breath, near syncope, syncope, dizziness or lightheadedness. She has brief palpitations every so often these are not daily perhaps not even weekly. They do not last longer than five minutes usually. She has not needed any metoprolol since coming off of your myrbetric.      Allergies   Allergen Reactions   • Shellfish-Derived Products Swelling     Mouth swelled   • Shrimp (Diagnostic) Swelling   • Oxybutynin Other (See Comments)     Acute renal insufficiency  Acute renal insufficiency     • Mirabegron Palpitations     Palpitations  Palpitations     • Other Itching and Rash     Cats ringworm    • Penicillins Rash     childhood       Past Medical History:   Diagnosis Date   • Atrial fibrillation (CMS/HCC)     history on med   • Blood in stool    • Chest pain     history   • Dizziness    • Fracture of radius     " "fall 12/27/16   • Frequent UTI     on prophylactic poab   • GERD (gastroesophageal reflux disease)    • Hemorrhoid    • Hypercalcemia    • IBS (irritable bowel syndrome)    • PAF (paroxysmal atrial fibrillation) (CMS/[facility])    • Parathyroid disorder (CMS/[facility])     MD monitoring   • Vertigo    • Wrist pain     left        Past Surgical History:   Procedure Laterality Date   • BLADDER SURGERY      tvt sling   • CARDIAC CATHETERIZATION     • COLONOSCOPY  10/23/2015    Non-thrombosed EH, Toruous colon, Diverticulosis, IH    • COLONOSCOPY  08/17/2010    Non-thrombosed EH, Diverticulosis, Tortuous colon, IH    • COLONOSCOPY  05/14/2007    Non thrombosed EH, Tortuous colon, One non bleeding polyp   • COLONOSCOPY N/A 5/2/2017    Procedure: COLONOSCOPY TO CECUM AND TI WITH COLD BIOPSIES POLYPECTOMY;  Surgeon: Deepak MARTINES MD;  Location: [facility] ENDOSCOPY;  Service:    • ENDOSCOPY N/A 5/2/2017    Procedure: ESOPHAGOGASTRODUODENOSCOPY  WITH COLD BIOPSY;  Surgeon: Deepak MARTINES MD;  Location: [facility] ENDOSCOPY;  Service:    • GALLBLADDER SURGERY     • HYSTERECTOMY      removed tubes & rt ovary also   • OOPHORECTOMY Left    • ORIF WRIST FRACTURE Left 12/30/2016    Procedure:  LT DISTAL RADIUS OPEN REDUCTION INTERNAL FIXATION;  Surgeon: Shawn Dorsey MD;  Location: [facility] MAIN OR;  Service:    • TONSILLECTOMY     • TONSILLECTOMY     • VARICOSE VEIN SURGERY      used wire         Family and social history reviewed.     Review of Systems   Cardiovascular: Positive for palpitations.   Gastrointestinal: Positive for nausea.     All other systems were reviewed and are negative          Objective:     Vitals:    04/20/20 1058 04/20/20 1109   BP: 146/66 134/66   BP Location: Left arm Right arm   Pulse: 74 69   Weight: 68 kg (150 lb)    Height: 167.6 cm (66\")      Body mass index is 24.21 kg/m².    PHYSICAL EXAM:  Physical Exam  Patient is well developed,glasses,  A & O x4, memory intact respirations normal rate, non labored, " SALOME, skin color pink    Procedures   Unable to assess    Current Outpatient Medications   Medication Sig Dispense Refill   • dilTIAZem CD (CARDIZEM CD) 360 MG 24 hr capsule TAKE ONE CAPSULE BY MOUTH DAILY 30 capsule 5   • esomeprazole (nexIUM) 40 MG capsule Take 1 capsule by mouth Daily. (Patient taking differently: Take 40 mg by mouth Daily As Needed.) 30 capsule 11   • estradiol (VAGIFEM) 10 MCG tablet vaginal tablet Insert 1 tablet into the vagina 1 (One) Time Per Week.     • lovastatin (MEVACOR) 10 MG tablet Take 5 mg by mouth.     • metoprolol tartrate (LOPRESSOR) 25 MG tablet Take up to twice daily as needed for palpitations. 60 tablet 0   • Multiple Vitamins-Minerals (PRESERVISION AREDS 2 PO) Take  by mouth Daily.     • XARELTO 20 MG tablet TAKE 1 TABLET DAILY 90 tablet 3     No current facility-administered medications for this visit.      Assessment:       Diagnosis Plan   1. Paroxysmal atrial fibrillation (CMS/[facility])     2. Vertigo     3. Mixed hyperlipidemia     4. Atrial flutter by electrocardiogram (CMS/[facility])          No orders of the defined types were placed in this encounter.        Plan:     1. 74 year old with paroxysmal atrial fibrillation, CHADS2-VASc score is 2, anticoagulated with Xarelto.   - very minimal palpitations she has PRN  Metoprolol if needed  2.Hyperlipidemia not on therapy per PCP  3.  Vertigo.  She has stopped taking meclizine. She improved with Epley Maneuvers . No dizziness.  4.  She takes Macrodantin daily to avoid urinary tract infection.  She has had manipulation of the bladder in the past and is  prone to infection.  5.  History of plantar fasciitis  6. Chest tightness - no recurrence    This patient has consented to a telehealth visit via video. The visit was scheduled as a video visit to comply with patient safety concerns in accordance with CDC recommendations.  All vitals recorded within this visit are reported by the patient.  I spent  25 minutes in total including but  not limited to the 10 minutes minutes spent in direct conversation with this patient.       Follow up in August as scheduled and call with questions or concerns            It has been a pleasure to participate in this patient's care.      Thank you,  ORACIO Cano

## 2020-05-08 ENCOUNTER — TRANSCRIBE ORDERS (OUTPATIENT)
Dept: ADMINISTRATIVE | Facility: HOSPITAL | Age: 75
End: 2020-05-08

## 2020-05-08 DIAGNOSIS — N17.9 ACUTE RENAL FAILURE, UNSPECIFIED ACUTE RENAL FAILURE TYPE (HCC): Primary | ICD-10-CM

## 2020-07-02 ENCOUNTER — HOSPITAL ENCOUNTER (OUTPATIENT)
Dept: ULTRASOUND IMAGING | Facility: HOSPITAL | Age: 75
Discharge: HOME | End: 2020-07-02
Admitting: INTERNAL MEDICINE

## 2020-07-02 DIAGNOSIS — N17.9 ACUTE RENAL FAILURE, UNSPECIFIED ACUTE RENAL FAILURE TYPE (HCC): ICD-10-CM

## 2020-07-02 PROCEDURE — 76775 US EXAM ABDO BACK WALL LIM: CPT

## 2020-08-06 RX ORDER — DILTIAZEM HYDROCHLORIDE 360 MG/1
CAPSULE, EXTENDED RELEASE ORAL
Qty: 30 CAPSULE | Refills: 4 | Status: SHIPPED | OUTPATIENT
Start: 2020-08-06 | End: 2021-01-06

## 2020-08-27 ENCOUNTER — OFFICE VISIT (OUTPATIENT)
Dept: CARDIOLOGY | Facility: CLINIC | Age: 75
End: 2020-08-27

## 2020-08-27 VITALS
HEART RATE: 59 BPM | WEIGHT: 149.4 LBS | SYSTOLIC BLOOD PRESSURE: 130 MMHG | DIASTOLIC BLOOD PRESSURE: 80 MMHG | HEIGHT: 66 IN | BODY MASS INDEX: 24.01 KG/M2

## 2020-08-27 DIAGNOSIS — I48.0 PAROXYSMAL ATRIAL FIBRILLATION (HCC): Primary | ICD-10-CM

## 2020-08-27 DIAGNOSIS — E78.2 MIXED HYPERLIPIDEMIA: ICD-10-CM

## 2020-08-27 PROCEDURE — 99214 OFFICE O/P EST MOD 30 MIN: CPT | Performed by: INTERNAL MEDICINE

## 2020-08-27 PROCEDURE — 93000 ELECTROCARDIOGRAM COMPLETE: CPT | Performed by: INTERNAL MEDICINE

## 2020-08-27 RX ORDER — CHOLECALCIFEROL (VITAMIN D3) 25 MCG
1000 TABLET,CHEWABLE ORAL DAILY
COMMUNITY

## 2020-08-27 NOTE — PROGRESS NOTES
Date of Office Visit: 20  Encounter Provider: Deepak Connell MD  Place of Service: [facility] CARDIOLOGY  Patient Name: Rebeca De Souza  :1945  Referral Provider:No ref. provider found      Chief Complaint   Patient presents with   • Follow-up     History of Present Illness  The patient is a pleasant 75-year-old female who has had an extensive cardiovascular  evaluation who had chest discomfort and multiple negative stress tests and then, in ,  had a cardiac catheterization at [facility] that was negative. She then presented  with three days of intermittent heart racing and chest pain and pressure. She presented  to [facility] on 2015, and was found to be in atrial fibrillation. She went back  into sinus rhythm on IV Cardizem and we started her on oral Cardizem. She had an  echocardiogram that showed normal left ventricular systolic function, normal saline  contrast study, and no significant valvular disease. She then wore a 30 day auto trigger atrial fibrillation event monitor.  She had no events.  So we stopped her Xarelto.  Then she had an episode in May 2018 where she was in atrial fibrillation/flutter went to the emergency room.  She was placed back on Xarelto and interestingly again in 2018 check another episode of atrial fibrillation .In 2018 she is having some atypical chest tightness had a normal ECG stress test and echocardiogram with normal LV function.  She comes in for follow-up. The patient denies chest pain, pressure and heaviness. No shortness of breath, othopnea or PND. No palpitations, near syncope or syncope. No stroke type symptoms like paralysis, paresthesia, abrupt vision loss and dysarthria. No bleeding like blood in the stool or dark stools.  Rare skipped beats lasting a few seconds.  She also had some problems with her kidney function but on repeat checks it was back to normal.    Atrial Fibrillation   Symptoms are  negative for dizziness and weakness. Past medical history includes atrial fibrillation.         Past Medical History:   Diagnosis Date   • Atrial fibrillation (CMS/HCC)     history on med   • Blood in stool    • Chest pain     history   • Dizziness    • Fracture of radius     fall 12/27/16   • Frequent UTI     on prophylactic poab   • GERD (gastroesophageal reflux disease)    • Hemorrhoid    • Hypercalcemia    • IBS (irritable bowel syndrome)    • PAF (paroxysmal atrial fibrillation) (CMS/HCC)    • Parathyroid disorder (CMS/HCC)     MD monitoring   • Vertigo    • Wrist pain     left          Past Surgical History:   Procedure Laterality Date   • BLADDER SURGERY      tvt sling   • CARDIAC CATHETERIZATION     • COLONOSCOPY  10/23/2015    Non-thrombosed EH, Toruous colon, Diverticulosis, IH    • COLONOSCOPY  08/17/2010    Non-thrombosed EH, Diverticulosis, Tortuous colon, IH    • COLONOSCOPY  05/14/2007    Non thrombosed EH, Tortuous colon, One non bleeding polyp   • COLONOSCOPY N/A 5/2/2017    Procedure: COLONOSCOPY TO CECUM AND TI WITH COLD BIOPSIES POLYPECTOMY;  Surgeon: Deepak MARTINES MD;  Location: [facility] ENDOSCOPY;  Service:    • ENDOSCOPY N/A 5/2/2017    Procedure: ESOPHAGOGASTRODUODENOSCOPY  WITH COLD BIOPSY;  Surgeon: Deepak MARTINES MD;  Location: [facility] ENDOSCOPY;  Service:    • GALLBLADDER SURGERY     • HYSTERECTOMY      removed tubes & rt ovary also   • OOPHORECTOMY Left    • ORIF WRIST FRACTURE Left 12/30/2016    Procedure:  LT DISTAL RADIUS OPEN REDUCTION INTERNAL FIXATION;  Surgeon: Shawn Dorsey MD;  Location: [facility] MAIN OR;  Service:    • TONSILLECTOMY     • TONSILLECTOMY     • VARICOSE VEIN SURGERY      used wire         Current Outpatient Medications on File Prior to Visit   Medication Sig Dispense Refill   • Cyanocobalamin (B-12) 1000 MCG capsule Take 1,000 mcg by mouth Daily.     • dilTIAZem CD (CARDIZEM CD) 360 MG 24 hr capsule TAKE ONE CAPSULE BY MOUTH DAILY 30 capsule 4   •  esomeprazole (nexIUM) 40 MG capsule Take 1 capsule by mouth Daily. (Patient taking differently: Take 40 mg by mouth Daily As Needed.) 30 capsule 11   • estradiol (VAGIFEM) 10 MCG tablet vaginal tablet Insert 1 tablet into the vagina 1 (One) Time Per Week.     • lovastatin (MEVACOR) 10 MG tablet Take 5 mg by mouth.     • metoprolol tartrate (LOPRESSOR) 25 MG tablet Take up to twice daily as needed for palpitations. 60 tablet 0   • Multiple Vitamins-Minerals (PRESERVISION AREDS 2 PO) Take  by mouth Daily.     • XARELTO 20 MG tablet TAKE 1 TABLET DAILY 90 tablet 3     No current facility-administered medications on file prior to visit.          Social History     Socioeconomic History   • Marital status:      Spouse name: Not on file   • Number of children: Not on file   • Years of education: Not on file   • Highest education level: Not on file   Tobacco Use   • Smoking status: Never Smoker   • Smokeless tobacco: Never Used   • Tobacco comment: caffeine use - coffee with milk   Substance and Sexual Activity   • Alcohol use: Yes     Comment: rarely   • Drug use: No     Types: Hydrocodone     Comment: prescribed by MD   • Sexual activity: Defer   Lifestyle   • Physical activity:     Days per week: 2 days     Minutes per session: 40 min   • Stress: Not at all       Family History   Problem Relation Age of Onset   • Hypertension Mother    • Heart attack Father    • Hypertension Father    • Stroke Paternal Grandmother    • Pancreatic cancer Brother    • Diabetes Brother    • Colon cancer Maternal Uncle    • Prostate cancer Brother    • Diabetes Brother          Review of Systems   Constitution: Negative for decreased appetite, diaphoresis, fever, malaise/fatigue, weight gain and weight loss.   HENT: Negative for congestion, hearing loss, nosebleeds and tinnitus.    Eyes: Negative for blurred vision, double vision, vision loss in left eye, vision loss in right eye and visual disturbance.   Cardiovascular:        As  "noted in HPI   Respiratory:        As noted HPI   Endocrine: Negative for cold intolerance and heat intolerance.   Hematologic/Lymphatic: Negative for bleeding problem. Does not bruise/bleed easily.   Skin: Negative for color change, flushing, itching and rash.   Musculoskeletal: Negative for arthritis, back pain, joint pain, joint swelling, muscle weakness and myalgias.   Gastrointestinal: Negative for bloating, abdominal pain, constipation, diarrhea, dysphagia, heartburn, hematemesis, hematochezia, melena, nausea and vomiting.   Genitourinary: Negative for bladder incontinence, dysuria, frequency, nocturia and urgency.   Neurological: Negative for dizziness, focal weakness, headaches, light-headedness, loss of balance, numbness, paresthesias, vertigo and weakness.   Psychiatric/Behavioral: Negative for depression, memory loss and substance abuse.       Procedures      ECG 12 Lead  Date/Time: 8/27/2020 11:45 AM  Performed by: Deepak Connell MD  Authorized by: Deepak Connell MD   Comparison: compared with previous ECG   Similar to previous ECG  Rhythm: sinus rhythm  Rate: normal  QRS axis: normal                  Objective:    /80 (BP Location: Right arm)   Pulse 59   Ht 167.6 cm (66\")   Wt 67.8 kg (149 lb 6.4 oz)   BMI 24.11 kg/m²        Physical Exam  Physical Exam   Constitutional: She is oriented to person, place, and time. She appears well-developed and well-nourished. No distress.   HENT:   Head: Normocephalic.   Eyes: Pupils are equal, round, and reactive to light. Conjunctivae are normal. No scleral icterus.   Neck: Normal carotid pulses, no hepatojugular reflux and no JVD present. Carotid bruit is not present. No tracheal deviation, no edema and no erythema present. No thyromegaly present.   Cardiovascular: Normal rate, regular rhythm, S1 normal, S2 normal, normal heart sounds and intact distal pulses.  No extrasystoles are present. PMI is not displaced. Exam reveals no gallop, no distant " heart sounds and no friction rub.   No murmur heard.  Pulses:       Carotid pulses are 2+ on the right side, and 2+ on the left side.       Radial pulses are 2+ on the right side, and 2+ on the left side.        Femoral pulses are 2+ on the right side, and 2+ on the left side.       Dorsalis pedis pulses are 2+ on the right side, and 2+ on the left side.        Posterior tibial pulses are 2+ on the right side, and 2+ on the left side.   Pulmonary/Chest: Effort normal and breath sounds normal. No respiratory distress. She has no decreased breath sounds. She has no wheezes. She has no rhonchi. She has no rales. She exhibits no tenderness.   Abdominal: Soft. Bowel sounds are normal. She exhibits no distension and no mass. There is no hepatosplenomegaly. There is no tenderness. There is no rebound and no guarding.   Musculoskeletal: She exhibits no edema, tenderness or deformity.   Neurological: She is alert and oriented to person, place, and time.   Skin: Skin is warm and dry. No rash noted. She is not diaphoretic. No cyanosis or erythema. No pallor. Nails show no clubbing.   Psychiatric: She has a normal mood and affect. Her speech is normal and behavior is normal. Judgment and thought content normal.           Assessment:    1. This is a 75-year-old female with paroxysmal atrial fibrillation, [id number] score of two, which puts her at high risk of embolic event. Then recurrent atrial fibrillation/flutter May and June 2018.  Stable on current regimen continue the same will see us in follow-up in a year and call if problems.  2. History of chest pain. Negative cardiovascular workup.  Including repeat negative cardiovascular workup with normal echocardiogram and ECG stress test in June 2018.  No recurrence.  3.  Renal insufficiency resolved.         Plan:

## 2020-11-16 RX ORDER — RIVAROXABAN 20 MG/1
TABLET, FILM COATED ORAL
Qty: 90 TABLET | Refills: 3 | Status: SHIPPED | OUTPATIENT
Start: 2020-11-16 | End: 2021-11-30

## 2021-01-06 RX ORDER — DILTIAZEM HYDROCHLORIDE 360 MG/1
CAPSULE, EXTENDED RELEASE ORAL
Qty: 30 CAPSULE | Refills: 5 | Status: SHIPPED | OUTPATIENT
Start: 2021-01-06 | End: 2021-07-06

## 2021-03-09 DIAGNOSIS — Z23 IMMUNIZATION DUE: ICD-10-CM

## 2021-07-06 RX ORDER — DILTIAZEM HYDROCHLORIDE 360 MG/1
CAPSULE, EXTENDED RELEASE ORAL
Qty: 90 CAPSULE | Refills: 2 | Status: SHIPPED | OUTPATIENT
Start: 2021-07-06 | End: 2022-04-11

## 2021-10-26 ENCOUNTER — OFFICE VISIT (OUTPATIENT)
Dept: CARDIOLOGY | Facility: CLINIC | Age: 76
End: 2021-10-26

## 2021-10-26 VITALS
SYSTOLIC BLOOD PRESSURE: 134 MMHG | HEIGHT: 66 IN | BODY MASS INDEX: 25.39 KG/M2 | DIASTOLIC BLOOD PRESSURE: 60 MMHG | WEIGHT: 158 LBS | HEART RATE: 69 BPM

## 2021-10-26 DIAGNOSIS — E78.2 MIXED HYPERLIPIDEMIA: ICD-10-CM

## 2021-10-26 DIAGNOSIS — I48.0 PAROXYSMAL ATRIAL FIBRILLATION (HCC): Primary | ICD-10-CM

## 2021-10-26 PROCEDURE — 93000 ELECTROCARDIOGRAM COMPLETE: CPT | Performed by: NURSE PRACTITIONER

## 2021-10-26 PROCEDURE — 99214 OFFICE O/P EST MOD 30 MIN: CPT | Performed by: NURSE PRACTITIONER

## 2021-10-26 NOTE — PROGRESS NOTES
"Date of Office Visit: 10/26/21  Encounter Provider: ORACIO Cano  Place of Service: [facility] CARDIOLOGY  Patient Name: Rebeca D eSouza  :1945    Chief Complaint   Patient presents with   • Paroxysmal atrial fibrillation   • Hypertension   • Stroke   • Follow-up   :     HPI: Rebeca De Souza is a 76 y.o. female  with history of Paroxysmal atrial fibrillation, hyperlipidemia, vertigo, and precordial pain.     She is followed by Dr. Connell and I will visit with her in follow up today.     In the past she has had multiple cardiovascular evaluations with negative stress testing.  In , she had some chest pain and had a negative cardiac catheterization performed at [facility].     In 2015 she was found to be in atrial fibrillation.  Follow-up 30-day monitor showed no recurrent atrial fibrillation so her anticoagulation was stopped.She had an echocardiogram was in 2015 that showed normal systolic function with an EF of 61%, trace to mild mitral regurgitation, normal RVSP, normal saline study.  There was fluid in the posterior to the ascending aorta measuring 1.3 cm concerning for possible dissection or inflammation.     There was an emergency department visit on 2018 with complaint of palpitations and feeling \"heart jumping around\".  She had some dizziness also.  This is been going on for the past 3 days.  She also complained of upper back pain.  Her heart rate was 134. troponin was negative.  EKG was  atrial flutter with 2-1 conduction.  adenosine was given x2  along with metoprolol IV .  She then converted to sinus rhythm with a heart rate of 59.  Heart diltiazem was increased.  She was discharged in stable condition.  There was another emergency department visit on 2018 with complaint of dizziness and was prescribed Antivert.     She had increased palpitations with Myrbetriq and at that time she used as needed metoprolol and weaned " "off of it.       She presents for reassessment.  She continues to take Xarelto daily.  She takes metoprolol as needed for palpitations.  She has had to take metoprolol twice since her last visit.  This is typically needed at night.  She walks 2 days a week for half hour and has no exertional symptoms.        Allergies   Allergen Reactions   • Shellfish-Derived Products Swelling     Mouth swelled   • Shrimp (Diagnostic) Swelling   • Oxybutynin Other (See Comments)     Acute renal insufficiency  Acute renal insufficiency     • Mirabegron Palpitations     Palpitations  Palpitations     • Other Itching and Rash     Cats ringworm    • Penicillins Rash     childhood           Family and social history reviewed.     ROS  All other systems were reviewed and are negative          Objective:     Vitals:    10/26/21 1503   BP: 134/60   BP Location: Left arm   Patient Position: Sitting   Pulse: 69   Weight: 71.7 kg (158 lb)   Height: 167.6 cm (66\")     Body mass index is 25.5 kg/m².    PHYSICAL EXAM:  Constitutional:       General: Not in acute distress.     Appearance: Well-developed. Not diaphoretic.   HENT:      Head: Normocephalic.   Pulmonary:      Effort: Pulmonary effort is normal. No respiratory distress.      Breath sounds: Normal breath sounds. No wheezing. No rhonchi. No rales.   Cardiovascular:      Normal rate. Regular rhythm.   Pulses:     Radial: 2+ bilaterally.  Skin:     General: Skin is warm and dry. There is no cyanosis.      Findings: No rash.   Neurological:      Mental Status: Alert and oriented to person, place, and time.   Psychiatric:         Behavior: Behavior normal.         Thought Content: Thought content normal.         Judgment: Judgment normal.           ECG 12 Lead    Date/Time: 10/26/2021 3:46 PM  Performed by: Micki Heller APRN  Authorized by: Micki Heller APRN   Comparison: compared with previous ECG   Similar to previous ECG  Rhythm: sinus rhythm  Rate: normal              Current " Outpatient Medications   Medication Sig Dispense Refill   • Cyanocobalamin (B-12) 1000 MCG capsule Take 1,000 mcg by mouth Daily.     • dilTIAZem (TIAZAC) 360 MG 24 hr capsule TAKE ONE CAPSULE BY MOUTH DAILY 90 capsule 2   • estradiol (VAGIFEM) 10 MCG tablet vaginal tablet Insert 1 tablet into the vagina 1 (One) Time Per Week.     • lovastatin (MEVACOR) 10 MG tablet Take 5 mg by mouth.     • metoprolol tartrate (LOPRESSOR) 25 MG tablet Take up to twice daily as needed for palpitations. 60 tablet 0   • Multiple Vitamins-Minerals (PRESERVISION AREDS 2 PO) Take  by mouth Daily.     • Xarelto 20 MG tablet TAKE 1 TABLET DAILY 90 tablet 3     No current facility-administered medications for this visit.     Assessment:       Diagnosis Plan   1. Paroxysmal atrial fibrillation (HCC)  ECG 12 Lead   2. Mixed hyperlipidemia          Orders Placed This Encounter   Procedures   • ECG 12 Lead     This order was created via procedure documentation     Order Specific Question:   Release to patient     Answer:   Immediate         Plan:       1. 76 year old with paroxysmal atrial fibrillation, CHADS2-VASc score is 2, anticoagulated with Xarelto.   - very minimal palpitations she has PRN  Metoprolol if needed.  She is to continue daily diltiazem and Xarelto.  2.Hyperlipidemia not on therapy per PCP  3.  Vertigo.  She has stopped taking meclizine. She improved with Epley Maneuvers . No dizziness.        Follow-up in 1 year call for questions or concerns.  She has requested to see Dr. Nunez in the future        It has been a pleasure to participate in this patient's care.      Thank you,  ORACIO Cano      **I used Dragon to dictate this note:**

## 2021-11-30 RX ORDER — RIVAROXABAN 20 MG/1
TABLET, FILM COATED ORAL
Qty: 90 TABLET | Refills: 3 | Status: SHIPPED | OUTPATIENT
Start: 2021-11-30 | End: 2022-10-27 | Stop reason: SDUPTHER

## 2021-11-30 NOTE — TELEPHONE ENCOUNTER
Please advise filling Xarelto    LOV   -   10/26/21 Micki  Next   -   10/24/22 Nunez  Last labs   -   8/19/21 CBC    5/25/21 CLARA SANTANA

## 2022-03-02 ENCOUNTER — TELEPHONE (OUTPATIENT)
Dept: CARDIOLOGY | Facility: CLINIC | Age: 77
End: 2022-03-02

## 2022-03-02 NOTE — TELEPHONE ENCOUNTER
Patient called and stated that she has some skin ca on her nose and she is needing to have it removed.  She is having it removed on 4/11/22 and then having plastic surgery on 4/13/22.  They are wanting to her to be off of the Xarelto for 3-5 days.      She was last seen on 10/26/22.  Please advise.    Patient was advised that both AL and JK are not in clinic this week and she is ok with waiting until next week.    CB: [phone]    Thanks,  Carolina

## 2022-04-11 RX ORDER — DILTIAZEM HYDROCHLORIDE 360 MG/1
CAPSULE, EXTENDED RELEASE ORAL
Qty: 90 CAPSULE | Refills: 1 | Status: SHIPPED | OUTPATIENT
Start: 2022-04-11 | End: 2022-10-27 | Stop reason: SDUPTHER

## 2022-05-05 ENCOUNTER — PRE-PROCEDURE SCREENING (OUTPATIENT)
Dept: GASTROENTEROLOGY | Facility: CLINIC | Age: 77
End: 2022-05-05

## 2022-06-07 ENCOUNTER — OFFICE VISIT (OUTPATIENT)
Dept: GASTROENTEROLOGY | Facility: CLINIC | Age: 77
End: 2022-06-07

## 2022-06-07 VITALS
SYSTOLIC BLOOD PRESSURE: 161 MMHG | DIASTOLIC BLOOD PRESSURE: 80 MMHG | HEART RATE: 82 BPM | TEMPERATURE: 96.8 F | HEIGHT: 66 IN | BODY MASS INDEX: 24.68 KG/M2 | WEIGHT: 153.6 LBS

## 2022-06-07 DIAGNOSIS — R19.7 DIARRHEA, UNSPECIFIED TYPE: ICD-10-CM

## 2022-06-07 DIAGNOSIS — R11.0 NAUSEA: Primary | ICD-10-CM

## 2022-06-07 DIAGNOSIS — Z80.0 FAMILY HISTORY OF GI MALIGNANCY: ICD-10-CM

## 2022-06-07 DIAGNOSIS — K63.5 POLYP OF COLON, UNSPECIFIED PART OF COLON, UNSPECIFIED TYPE: ICD-10-CM

## 2022-06-07 PROCEDURE — 99203 OFFICE O/P NEW LOW 30 MIN: CPT | Performed by: INTERNAL MEDICINE

## 2022-06-07 NOTE — PROGRESS NOTES
Chief Complaint   Patient presents with   • Diarrhea   • Nausea   • mucus in stool   • Constipation   • Fecal Incontinence   • Bloated        Rebeca De Souza is a  77 y.o. female here for a follow up visit for nausea, diarrhea, abdominal bloating, incontinence, history of polyps, remote family history of colon cancer    HPI this 77-year-old white female patient of Dr. Tianna Hensley presents since last evaluated in 2017.  She underwent upper and lower endoscopies in May of that year because of heme positive stool and bowel pattern changes as well as a remote family history and personal history of polyps.  Upper endoscopy did reveal some gastritis and esophagitis.  Colonoscopy showed 1 hyperplastic polyp and otherwise biopsies were normal throughout.  She states problems with incontinence of her bowels noted usually after her midday meal.  She will have accidents upon arising.  She admits to vertigo issues.  She has symptoms of nausea that occur upon arising from sleep.  Her weight has been stable.  No reported melena or bright red blood per rectum but she does note an orange appearance to her stools as well as mucus production.    Past Medical History:   Diagnosis Date   • Atrial fibrillation (HCC)     history on med   • Blood in stool    • Chest pain     history   • Dizziness    • Fracture of radius     fall 12/27/16   • Frequent UTI     on prophylactic poab   • GERD (gastroesophageal reflux disease)    • Hemorrhoid    • Hypercalcemia    • IBS (irritable bowel syndrome)    • PAF (paroxysmal atrial fibrillation) ([facility])    • Parathyroid disorder ([facility])     MD monitoring   • Vertigo    • Wrist pain     left        Current Outpatient Medications   Medication Sig Dispense Refill   • dilTIAZem (TIAZAC) 360 MG 24 hr capsule TAKE ONE CAPSULE BY MOUTH DAILY 90 capsule 1   • estradiol (VAGIFEM) 10 MCG tablet vaginal tablet Insert 1 tablet into the vagina 1 (One) Time Per Week.     • lovastatin (MEVACOR) 10 MG tablet Take 5  mg by mouth.     • metoprolol tartrate (LOPRESSOR) 25 MG tablet Take up to twice daily as needed for palpitations. 60 tablet 0   • Multiple Vitamins-Minerals (PRESERVISION AREDS 2 PO) Take  by mouth Daily.     • Xarelto 20 MG tablet TAKE 1 TABLET DAILY 90 tablet 3   • Cyanocobalamin (B-12) 1000 MCG capsule Take 1,000 mcg by mouth Daily.       No current facility-administered medications for this visit.       PRN Meds:.    Allergies   Allergen Reactions   • Shellfish-Derived Products Swelling     Mouth swelled   • Shrimp (Diagnostic) Swelling   • Oxybutynin Other (See Comments)     Acute renal insufficiency  Acute renal insufficiency     • Mirabegron Palpitations     Palpitations  Palpitations     • Other Itching and Rash     Cats ringworm    • Penicillins Rash     childhood       Social History     Socioeconomic History   • Marital status:    Tobacco Use   • Smoking status: Never Smoker   • Smokeless tobacco: Never Used   • Tobacco comment: caffeine use - very little coffee    Substance and Sexual Activity   • Alcohol use: Yes     Comment: rarely   • Drug use: No   • Sexual activity: Defer       Family History   Problem Relation Age of Onset   • Hypertension Mother    • Heart attack Father    • Hypertension Father    • Pancreatic cancer Brother    • Diabetes Brother    • Prostate cancer Brother    • Diabetes Brother    • Colon polyps Maternal Uncle    • Colon cancer Maternal Uncle    • Stroke Paternal Grandmother        Review of Systems   Constitutional: Negative for activity change, appetite change, fatigue and unexpected weight change.   HENT: Negative for congestion, facial swelling, sore throat, trouble swallowing and voice change.    Eyes: Negative for photophobia and visual disturbance.   Respiratory: Negative for cough and choking.    Cardiovascular: Negative for chest pain.   Gastrointestinal: Positive for abdominal distention, constipation, diarrhea and nausea. Negative for abdominal pain, anal  bleeding, blood in stool, rectal pain and vomiting.   Endocrine: Negative for polyphagia.   Musculoskeletal: Negative for arthralgias, gait problem and joint swelling.   Skin: Negative for color change, pallor and rash.   Allergic/Immunologic: Negative for food allergies.   Neurological: Negative for speech difficulty and headaches.   Hematological: Does not bruise/bleed easily.   Psychiatric/Behavioral: Negative for agitation, confusion and sleep disturbance.       Vitals:    06/07/22 1540   BP: 161/80   Pulse: 82   Temp: 96.8 °F (36 °C)       Physical Exam  Vitals reviewed.   Constitutional:       General: She is not in acute distress.     Appearance: She is well-developed. She is not diaphoretic.   HENT:      Head: Normocephalic.      Mouth/Throat:      Pharynx: No oropharyngeal exudate.   Eyes:      General: No scleral icterus.     Conjunctiva/sclera: Conjunctivae normal.   Neck:      Thyroid: No thyromegaly.   Cardiovascular:      Rate and Rhythm: Normal rate and regular rhythm.      Heart sounds: No murmur heard.  Pulmonary:      Effort: No respiratory distress.      Breath sounds: Normal breath sounds. No wheezing or rales.   Abdominal:      General: Bowel sounds are normal. There is no distension.      Palpations: Abdomen is soft. There is no mass.      Tenderness: There is no abdominal tenderness.   Musculoskeletal:         General: No tenderness. Normal range of motion.      Cervical back: Normal range of motion.   Lymphadenopathy:      Cervical: No cervical adenopathy.   Skin:     General: Skin is warm and dry.      Findings: No erythema or rash.   Neurological:      Mental Status: She is alert and oriented to person, place, and time.   Psychiatric:         Behavior: Behavior normal.         ASSESSMENT   #1 nausea  #2 abdominal bloating  #3 IBS with constipation and diarrhea  #4 history of polyps  #5 remote family history  #6 fecal incontinence      PLAN  Schedule EGD and colonoscopy  Use Imodium as  needed  Pending endoscopic results may consider anorectal manometry      ICD-10-CM ICD-9-CM   1. Nausea  R11.0 787.02   2. Diarrhea, unspecified type  R19.7 787.91   3. Polyp of colon, unspecified part of colon, unspecified type  K63.5 211.3   4. Family history of GI malignancy  Z80.0 V16.0

## 2022-06-13 ENCOUNTER — TELEPHONE (OUTPATIENT)
Dept: GASTROENTEROLOGY | Facility: CLINIC | Age: 77
End: 2022-06-13

## 2022-06-13 PROBLEM — K63.5 POLYP OF COLON: Status: ACTIVE | Noted: 2022-06-13

## 2022-06-13 PROBLEM — R19.7 DIARRHEA: Status: ACTIVE | Noted: 2022-06-13

## 2022-06-13 PROBLEM — Z80.0 FAMILY HISTORY OF GI MALIGNANCY: Status: ACTIVE | Noted: 2022-06-13

## 2022-06-13 PROBLEM — R11.0 NAUSEA: Status: ACTIVE | Noted: 2022-06-13

## 2022-06-13 NOTE — TELEPHONE ENCOUNTER
DALE patient in person for EGD/CS. Rescheduled to [facility] 08/26/2022 with arrival time 12:30pm. Prep packet handed to patient in office. Patient advised arrival time may vary based on [facility] guidelines. DALE Cuevas--Chris Vázquez at [facility] to cancel procedure at location

## 2022-08-24 ENCOUNTER — APPOINTMENT (OUTPATIENT)
Dept: LAB | Facility: HOSPITAL | Age: 77
End: 2022-08-24

## 2022-08-24 ENCOUNTER — LAB (OUTPATIENT)
Dept: LAB | Facility: HOSPITAL | Age: 77
End: 2022-08-24

## 2022-08-24 DIAGNOSIS — K63.5 POLYP OF COLON, UNSPECIFIED PART OF COLON, UNSPECIFIED TYPE: ICD-10-CM

## 2022-08-24 DIAGNOSIS — R19.7 DIARRHEA, UNSPECIFIED TYPE: ICD-10-CM

## 2022-08-24 DIAGNOSIS — R11.0 NAUSEA: ICD-10-CM

## 2022-08-24 DIAGNOSIS — Z80.0 FAMILY HISTORY OF GI MALIGNANCY: ICD-10-CM

## 2022-08-24 LAB — SARS-COV-2 ORF1AB RESP QL NAA+PROBE: NOT DETECTED

## 2022-08-24 PROCEDURE — C9803 HOPD COVID-19 SPEC COLLECT: HCPCS

## 2022-08-24 PROCEDURE — U0005 INFEC AGEN DETEC AMPLI PROBE: HCPCS

## 2022-08-24 PROCEDURE — U0004 COV-19 TEST NON-CDC HGH THRU: HCPCS

## 2022-08-26 ENCOUNTER — HOSPITAL ENCOUNTER (OUTPATIENT)
Facility: HOSPITAL | Age: 77
Setting detail: HOSPITAL OUTPATIENT SURGERY
Discharge: HOME | End: 2022-08-26
Attending: INTERNAL MEDICINE | Admitting: INTERNAL MEDICINE

## 2022-08-26 ENCOUNTER — ANESTHESIA EVENT (OUTPATIENT)
Dept: GASTROENTEROLOGY | Facility: HOSPITAL | Age: 77
End: 2022-08-26

## 2022-08-26 ENCOUNTER — ANESTHESIA (OUTPATIENT)
Dept: GASTROENTEROLOGY | Facility: HOSPITAL | Age: 77
End: 2022-08-26

## 2022-08-26 VITALS
SYSTOLIC BLOOD PRESSURE: 149 MMHG | HEIGHT: 66 IN | OXYGEN SATURATION: 98 % | BODY MASS INDEX: 23.63 KG/M2 | TEMPERATURE: 98 F | HEART RATE: 62 BPM | RESPIRATION RATE: 15 BRPM | DIASTOLIC BLOOD PRESSURE: 78 MMHG | WEIGHT: 147 LBS

## 2022-08-26 DIAGNOSIS — R11.0 NAUSEA: ICD-10-CM

## 2022-08-26 DIAGNOSIS — Z80.0 FAMILY HISTORY OF GI MALIGNANCY: ICD-10-CM

## 2022-08-26 DIAGNOSIS — K63.5 POLYP OF COLON, UNSPECIFIED PART OF COLON, UNSPECIFIED TYPE: ICD-10-CM

## 2022-08-26 DIAGNOSIS — R19.7 DIARRHEA, UNSPECIFIED TYPE: ICD-10-CM

## 2022-08-26 PROCEDURE — 88305 TISSUE EXAM BY PATHOLOGIST: CPT | Performed by: INTERNAL MEDICINE

## 2022-08-26 PROCEDURE — S0260 H&P FOR SURGERY: HCPCS | Performed by: INTERNAL MEDICINE

## 2022-08-26 PROCEDURE — 45378 DIAGNOSTIC COLONOSCOPY: CPT | Performed by: INTERNAL MEDICINE

## 2022-08-26 PROCEDURE — 87081 CULTURE SCREEN ONLY: CPT | Performed by: INTERNAL MEDICINE

## 2022-08-26 PROCEDURE — 25010000002 PROPOFOL 10 MG/ML EMULSION: Performed by: ANESTHESIOLOGY

## 2022-08-26 PROCEDURE — 43239 EGD BIOPSY SINGLE/MULTIPLE: CPT | Performed by: INTERNAL MEDICINE

## 2022-08-26 RX ORDER — LIDOCAINE HYDROCHLORIDE 20 MG/ML
INJECTION, SOLUTION INFILTRATION; PERINEURAL AS NEEDED
Status: DISCONTINUED | OUTPATIENT
Start: 2022-08-26 | End: 2022-08-26 | Stop reason: SURG

## 2022-08-26 RX ORDER — SODIUM CHLORIDE, SODIUM LACTATE, POTASSIUM CHLORIDE, CALCIUM CHLORIDE 600; 310; 30; 20 MG/100ML; MG/100ML; MG/100ML; MG/100ML
30 INJECTION, SOLUTION INTRAVENOUS CONTINUOUS PRN
Status: DISCONTINUED | OUTPATIENT
Start: 2022-08-26 | End: 2022-08-26 | Stop reason: HOSPADM

## 2022-08-26 RX ORDER — PROPOFOL 10 MG/ML
VIAL (ML) INTRAVENOUS AS NEEDED
Status: DISCONTINUED | OUTPATIENT
Start: 2022-08-26 | End: 2022-08-26 | Stop reason: SURG

## 2022-08-26 RX ORDER — PROPOFOL 10 MG/ML
VIAL (ML) INTRAVENOUS CONTINUOUS PRN
Status: DISCONTINUED | OUTPATIENT
Start: 2022-08-26 | End: 2022-08-26 | Stop reason: SURG

## 2022-08-26 RX ORDER — SODIUM CHLORIDE 0.9 % (FLUSH) 0.9 %
10 SYRINGE (ML) INJECTION AS NEEDED
Status: DISCONTINUED | OUTPATIENT
Start: 2022-08-26 | End: 2022-08-26 | Stop reason: HOSPADM

## 2022-08-26 RX ORDER — SODIUM CHLORIDE 0.9 % (FLUSH) 0.9 %
10 SYRINGE (ML) INJECTION EVERY 12 HOURS SCHEDULED
Status: DISCONTINUED | OUTPATIENT
Start: 2022-08-26 | End: 2022-08-26 | Stop reason: HOSPADM

## 2022-08-26 RX ADMIN — PROPOFOL 90 MG: 10 INJECTION, EMULSION INTRAVENOUS at 14:29

## 2022-08-26 RX ADMIN — SODIUM CHLORIDE, POTASSIUM CHLORIDE, SODIUM LACTATE AND CALCIUM CHLORIDE 30 ML/HR: 600; 310; 30; 20 INJECTION, SOLUTION INTRAVENOUS at 13:22

## 2022-08-26 RX ADMIN — LIDOCAINE HYDROCHLORIDE 60 MG: 20 INJECTION, SOLUTION INFILTRATION; PERINEURAL at 14:29

## 2022-08-26 RX ADMIN — Medication 140 MCG/KG/MIN: at 14:29

## 2022-08-26 NOTE — ANESTHESIA POSTPROCEDURE EVALUATION
Patient: Rebeca De Souza    Procedure Summary     Date: 08/26/22 Room / Location: [facility] ENDOSCOPY 10 /  [facility]    Anesthesia Start: 1427 Anesthesia Stop: 1503    Procedures:       ESOPHAGOGASTRODUODENOSCOPY with biopsies (N/A Esophagus)      COLONOSCOPY to cecum and TI (N/A ) Diagnosis:       Esophagitis      Gastritis      Duodenogastric bile reflux      Diverticulosis      Tortuous colon      Hemorrhoids      (Nausea [R11.0])      (Diarrhea, unspecified type [R19.7])      (Polyp of colon, unspecified part of colon, unspecified type [K63.5])      (Family history of GI malignancy [Z80.0])    Surgeons: Deepak Amezcua MD Provider: Soo Jalloh MD    Anesthesia Type: MAC ASA Status: 3          Anesthesia Type: MAC    Vitals  Vitals Value Taken Time   /81 08/26/22 1506   Temp     Pulse 75 08/26/22 1506   Resp 12 08/26/22 1506   SpO2 99 % 08/26/22 1506           Post Anesthesia Care and Evaluation    Patient location during evaluation: bedside  Patient participation: complete - patient participated  Level of consciousness: awake and alert  Pain management: adequate    Airway patency: patent  Anesthetic complications: No anesthetic complications  PONV Status: controlled  Cardiovascular status: acceptable  Respiratory status: acceptable  Hydration status: acceptable

## 2022-08-26 NOTE — ANESTHESIA PREPROCEDURE EVALUATION
" Anesthesia Evaluation     Patient summary reviewed and Nursing notes reviewed   NPO Solid Status: > 8 hours  NPO Liquid Status: > 2 hours           Airway   Mallampati: II  TM distance: >3 FB  Neck ROM: full  No difficulty expected  Dental    (+) implants        Pulmonary - normal exam   (-) COPD, sleep apnea  Cardiovascular   Exercise tolerance: good (4-7 METS)    PT is on anticoagulation therapy  Patient on routine beta blocker and Beta blocker given within 24 hours of surgery    (+) hypertension 2 medications or greater, dysrhythmias Atrial Fib, hyperlipidemia,       Neuro/Psych  (+) dizziness/light headedness,    (-) seizures, CVA  GI/Hepatic/Renal/Endo    (+)  GERD,      ROS Comment: hyperparathyroid    Musculoskeletal     Abdominal    Substance History      OB/GYN          Other                      Anesthesia Plan    ASA 3     MAC     (I have reviewed the patient's history and chart with the patient, including all pertinent laboratory results and imaging. I have explained the risks of anesthesia including but not limited to dental damage, corneal abrasion, nerve injury, MI, stroke, aspiration, and death. Patient has agreed to proceed.     /63 (BP Location: Left arm, Patient Position: Lying)   Pulse 74   Resp 16   Ht 167.6 cm (66\")   Wt 66.7 kg (147 lb)   SpO2 98%   BMI 23.73 kg/m²   )  intravenous induction     Anesthetic plan, risks, benefits, and alternatives have been provided, discussed and informed consent has been obtained with: patient.        CODE STATUS:       "

## 2022-08-26 NOTE — DISCHARGE INSTRUCTIONS

## 2022-08-26 NOTE — H&P
[facility]  Pre Procedure History & Physical    Chief Complaint:   Nausea, personal history of polyps, family history, IBS with incontinence.    Subjective     HPI:   77-year-old female presents the endoscopy suite for upper and lower endoscopic evaluations.  She has had issues with nausea as well as abdominal bloating.  Also complains of irritable bowel syndrome with bouts of incontinence.  Finally has a personal history of polyps.  Last endoscopic evaluations in 2017.    Past Medical History:   Past Medical History:   Diagnosis Date   • Atrial fibrillation (HCC)     history on med   • Blood in stool    • Chest pain     history   • Dizziness    • Fracture of radius     fall 12/27/16   • Frequent UTI     on prophylactic poab   • GERD (gastroesophageal reflux disease)    • Hemorrhoid    • Hypercalcemia    • IBS (irritable bowel syndrome)    • PAF (paroxysmal atrial fibrillation) ([facility])    • Parathyroid disorder ([facility])     MD monitoring   • Vertigo    • Wrist pain     left        Past Surgical History:  Past Surgical History:   Procedure Laterality Date   • BLADDER SURGERY      tvt sling   • CARDIAC CATHETERIZATION     • COLONOSCOPY  10/23/2015    Non-thrombosed EH, Toruous colon, Diverticulosis, IH    • COLONOSCOPY  08/17/2010    Non-thrombosed EH, Diverticulosis, Tortuous colon, IH    • COLONOSCOPY  05/14/2007    Non thrombosed EH, Tortuous colon, One non bleeding polyp   • COLONOSCOPY N/A 5/2/2017    Procedure: COLONOSCOPY TO CECUM AND TI WITH COLD BIOPSIES POLYPECTOMY;  Surgeon: Deepak MARTINES MD;  Location: [facility] ENDOSCOPY;  Service:    • ENDOSCOPY N/A 5/2/2017    Procedure: ESOPHAGOGASTRODUODENOSCOPY  WITH COLD BIOPSY;  Surgeon: Deepak MARTINES MD;  Location: [facility] ENDOSCOPY;  Service:    • GALLBLADDER SURGERY     • HYSTERECTOMY      removed tubes & rt ovary also   • OOPHORECTOMY Left    • ORIF WRIST FRACTURE Left 12/30/2016    Procedure:  LT DISTAL RADIUS OPEN REDUCTION  INTERNAL FIXATION;  Surgeon: Shawn Dorsey MD;  Location: [facility] OR;  Service:    • TONSILLECTOMY     • TONSILLECTOMY     • VARICOSE VEIN SURGERY      used wire       Family History:  Family History   Problem Relation Age of Onset   • Hypertension Mother    • Heart attack Father    • Hypertension Father    • Pancreatic cancer Brother    • Diabetes Brother    • Prostate cancer Brother    • Diabetes Brother    • Colon polyps Maternal Uncle    • Colon cancer Maternal Uncle    • Stroke Paternal Grandmother        Social History:   reports that she has never smoked. She has never used smokeless tobacco. She reports current alcohol use. She reports that she does not use drugs.    Medications:   No medications prior to admission.       Allergies:  Shellfish-derived products, Shrimp (diagnostic), Oxybutynin, Mirabegron, Other, and Penicillins    ROS:    Pertinent items are noted in HPI, all other systems reviewed and negative     Objective     There were no vitals taken for this visit.    Physical Exam   Constitutional: Pt is oriented to person, place, and time and well-developed, well-nourished, and in no distress.   Mouth/Throat: Oropharynx is clear and moist.   Neck: Normal range of motion.   Cardiovascular: Normal rate, regular rhythm and normal heart sounds.    Pulmonary/Chest: Effort normal and breath sounds normal.   Abdominal: Soft. Nontender  Skin: Skin is warm and dry.   Psychiatric: Mood, memory, affect and judgment normal.     Assessment & Plan     Diagnosis:  Nausea  Abdominal bloating  IBS  History of polyps  Remote family history    Anticipated Surgical Procedure:  EGD, colonoscopy    The risks, benefits, and alternatives of this procedure have been discussed with the patient or the responsible party- the patient understands and agrees to proceed.

## 2022-08-27 LAB — UREASE TISS QL: NEGATIVE

## 2022-08-29 LAB
LAB AP CASE REPORT: NORMAL
PATH REPORT.FINAL DX SPEC: NORMAL
PATH REPORT.GROSS SPEC: NORMAL

## 2022-09-09 ENCOUNTER — TELEPHONE (OUTPATIENT)
Dept: GASTROENTEROLOGY | Facility: CLINIC | Age: 77
End: 2022-09-09

## 2022-09-09 NOTE — TELEPHONE ENCOUNTER
Called pt and per path report advised that the duodenum bx was benign. The stomach bx showed moderate gastroapthy and was neg for h pylori. The ge junction bx showed mild inflammation and was neg for wallace's.  Advised of Dr Amezcua's recommendations.  Pt verb understanding.     Pt reports that she can not take the ppi's due to osteoporosis. Update sent to Dr Amezcua.    C/s placed in recall and hm for 08/26/2027.  Also f/u placed in recall and hm for 08/26/2023.

## 2022-09-09 NOTE — TELEPHONE ENCOUNTER
----- Message from Deepak MARTINES MD sent at 9/4/2022  2:36 PM EDT -----  Regarding: Biopsy results  Okay to call results, recommend consider initiating/resuming PPI.  Can discuss risk-benefit ratio of follow-up colonoscopy in 5 years.  Office follow-up annually, call sooner as needed.  ----- Message -----  From: Lab, Background User  Sent: 8/27/2022   7:00 PM EDT  To: Deepak MARTINES MD

## 2022-09-13 RX ORDER — FAMOTIDINE 40 MG/1
40 TABLET, FILM COATED ORAL 2 TIMES DAILY
Qty: 60 TABLET | Refills: 3 | Status: SHIPPED | OUTPATIENT
Start: 2022-09-13

## 2022-09-13 NOTE — TELEPHONE ENCOUNTER
Called pt and advised of Dr Amezcua's note. Verb understanding.   Pt would like to try the pepcid 40mg po bid.  Script escribed #60 with 3 refills to her [facility] pharmacy.  Update sent to Dr Amezcua.

## 2022-09-13 NOTE — TELEPHONE ENCOUNTER
Osteoporosis is not a contraindication to PPIs, however, if she wishes to take an alternative we will put her on famotidine double strength which is 40 mg twice daily.

## 2022-10-27 ENCOUNTER — OFFICE VISIT (OUTPATIENT)
Dept: CARDIOLOGY | Facility: CLINIC | Age: 77
End: 2022-10-27

## 2022-10-27 VITALS
OXYGEN SATURATION: 97 % | BODY MASS INDEX: 24.53 KG/M2 | DIASTOLIC BLOOD PRESSURE: 80 MMHG | HEART RATE: 77 BPM | SYSTOLIC BLOOD PRESSURE: 130 MMHG | HEIGHT: 66 IN | WEIGHT: 152.6 LBS

## 2022-10-27 DIAGNOSIS — I48.0 PAROXYSMAL ATRIAL FIBRILLATION: Primary | ICD-10-CM

## 2022-10-27 DIAGNOSIS — R00.2 PALPITATIONS: ICD-10-CM

## 2022-10-27 PROCEDURE — 99214 OFFICE O/P EST MOD 30 MIN: CPT | Performed by: NURSE PRACTITIONER

## 2022-10-27 PROCEDURE — 93000 ELECTROCARDIOGRAM COMPLETE: CPT | Performed by: NURSE PRACTITIONER

## 2022-10-27 RX ORDER — DILTIAZEM HYDROCHLORIDE 360 MG/1
360 CAPSULE, EXTENDED RELEASE ORAL DAILY
Qty: 90 CAPSULE | Refills: 3 | Status: SHIPPED | OUTPATIENT
Start: 2022-10-27

## 2022-10-27 NOTE — PROGRESS NOTES
Date of Office Visit: 10/27/2022  Encounter Provider: ORACIO Pineda  Place of Service: [facility] CARDIOLOGY  Patient Name: Rebeca De Souza  :1945    Chief Complaint   Patient presents with   • Palpitations   • Shortness of Breath   • Chest Pain   • Follow-up   :     HPI: Rebeca De Souza is a 77 y.o. female who is a patient who previously followed with Dr. Connell has been seeing ORACIO Cano.  She is new to me today and presents for 1 year follow-up.  She has a history of paroxysmal atrial fibrillation, hyperlipidemia, vertigo and precordial pain.    In  she had some chest pain and negative cardiac cath performed at [facility].    2015 she was found to be in atrial fibrillation.  Follow-up 30-day event monitor showed no recurrent atrial fibrillation.  Her anticoagulation was stopped.  In 2015 an echocardiogram showed normal systolic function with an EF of 61%, trace to mild mitral regurgitation, normal RVSP, normal saline study.    Patient was seen in the emergency department in May 2018 and found to be in atrial flutter.  Adenosine was given x2 along with IV metoprolol.  She converted to sinus rhythm.  Her diltiazem was increased at that time.    On her last office visit, she continued on Xarelto daily and took metoprolol as needed for palpitations.  It appears she only needed to take her metoprolol twice since her last visit.    Today patient presents with concern that she has been having more palpitations at night.  She has taken her metoprolol about 3 times in the last week.  Her blood pressure appears controlled.  She is in a sinus rhythm at this time.  She continues to take Xarelto.  Patient would like to exercise more but was afraid to with atrial fibrillation.  She is scheduled to have consult with sleep medicine in beginning of November, she states she has never had a sleep study and does snore at night.  She denies  chest pain, shortness of breath, lower extremity edema or dizziness.  She does have vertigo that tends to get worse with change of seasons but it has not been bad yet.     Previous testing and notes have been reviewed by me.   Past Medical History:   Diagnosis Date   • Atrial fibrillation (HCC)     history on med   • Blood in stool    • Chest pain     history   • Dizziness    • Fracture of radius     fall 12/27/16   • Frequent UTI     on prophylactic poab   • GERD (gastroesophageal reflux disease)    • Hemorrhoid    • Hypercalcemia    • IBS (irritable bowel syndrome)    • PAF (paroxysmal atrial fibrillation) (HCC)    • Parathyroid disorder (HCC)     MD monitoring   • Vertigo    • Wrist pain     left        Past Surgical History:   Procedure Laterality Date   • BLADDER SURGERY      tvt sling   • CARDIAC CATHETERIZATION     • COLONOSCOPY  10/23/2015    Non-thrombosed EH, Toruous colon, Diverticulosis, IH    • COLONOSCOPY  08/17/2010    Non-thrombosed EH, Diverticulosis, Tortuous colon, IH    • COLONOSCOPY  05/14/2007    Non thrombosed EH, Tortuous colon, One non bleeding polyp   • COLONOSCOPY N/A 5/2/2017    Procedure: COLONOSCOPY TO CECUM AND TI WITH COLD BIOPSIES POLYPECTOMY;  Surgeon: Deepak MARTINES MD;  Location: [facility] ENDOSCOPY;  Service:    • COLONOSCOPY N/A 8/26/2022    Procedure: COLONOSCOPY to cecum and TI;  Surgeon: Deepak Amezcua MD;  Location: [facility] ENDOSCOPY;  Service: Gastroenterology;  Laterality: N/A;  PRE- hx of polyps, IBS, abdominal bloating  POST- diverticulosis, hemorrhoids   • ENDOSCOPY N/A 5/2/2017    Procedure: ESOPHAGOGASTRODUODENOSCOPY  WITH COLD BIOPSY;  Surgeon: Deepak MARTINES MD;  Location: [facility] ENDOSCOPY;  Service:    • ENDOSCOPY N/A 8/26/2022    Procedure: ESOPHAGOGASTRODUODENOSCOPY with biopsies;  Surgeon: Deepak Amezcua MD;  Location: [facility] ENDOSCOPY;  Service: Gastroenterology;  Laterality: N/A;  PRE- nausea, abdominal bloating  POST- esophagitis,  diffuse gastritis   • GALLBLADDER SURGERY     • HYSTERECTOMY      removed tubes & rt ovary also   • OOPHORECTOMY Left    • ORIF WRIST FRACTURE Left 12/30/2016    Procedure:  LT DISTAL RADIUS OPEN REDUCTION INTERNAL FIXATION;  Surgeon: Shawn Dorsey MD;  Location: [facility] OR;  Service:    • TONSILLECTOMY     • TONSILLECTOMY     • VARICOSE VEIN SURGERY      used wire       Social History     Socioeconomic History   • Marital status:    Tobacco Use   • Smoking status: Never   • Smokeless tobacco: Never   • Tobacco comments:     caffeine use - very little coffee    Vaping Use   • Vaping Use: Never used   Substance and Sexual Activity   • Alcohol use: Yes     Comment: rarely   • Drug use: No   • Sexual activity: Defer       Family History   Problem Relation Age of Onset   • Hypertension Mother    • Heart attack Father    • Hypertension Father    • Pancreatic cancer Brother    • Diabetes Brother    • Prostate cancer Brother    • Diabetes Brother    • Colon polyps Maternal Uncle    • Colon cancer Maternal Uncle    • Stroke Paternal Grandmother        Review of Systems   Constitutional: Negative.   HENT: Negative.    Eyes: Negative.    Cardiovascular: Negative.    Hematologic/Lymphatic:        Xarelto 20 mg daily   Skin: Negative.    Musculoskeletal: Negative.    Gastrointestinal: Negative.    Genitourinary: Negative.    Neurological: Negative.    Psychiatric/Behavioral: Negative.    Allergic/Immunologic: Negative.        Allergies   Allergen Reactions   • Shellfish-Derived Products Swelling     Mouth swelled   • Shrimp (Diagnostic) Swelling   • Oxybutynin Other (See Comments)     Acute renal insufficiency  Acute renal insufficiency     • Mirabegron Palpitations     Palpitations  Palpitations     • Other Itching and Rash     Cats ringworm    • Penicillins Rash     childhood         Current Outpatient Medications:   •  dilTIAZem (TIAZAC) 360 MG 24 hr capsule, Take 1 capsule by mouth Daily., Disp: 90 capsule, Rfl:  "3  •  estradiol (VAGIFEM) 10 MCG tablet vaginal tablet, Insert 1 tablet into the vagina 1 (One) Time Per Week., Disp: , Rfl:   •  famotidine (Pepcid) 40 MG tablet, Take 1 tablet by mouth 2 (Two) Times a Day., Disp: 60 tablet, Rfl: 3  •  lovastatin (MEVACOR) 10 MG tablet, Take 1 tablet by mouth., Disp: , Rfl:   •  metoprolol tartrate (LOPRESSOR) 25 MG tablet, Take up to twice daily as needed for palpitations., Disp: 120 tablet, Rfl: 3  •  Multiple Vitamins-Minerals (PRESERVISION AREDS 2 PO), Take  by mouth Daily., Disp: , Rfl:   •  rivaroxaban (Xarelto) 20 MG tablet, Take 1 tablet by mouth Daily., Disp: 90 tablet, Rfl: 3  •  Cyanocobalamin (B-12) 1000 MCG capsule, Take 1,000 mcg by mouth Daily., Disp: , Rfl:       Objective:     Vitals:    10/27/22 1228   BP: 130/80   BP Location: Left arm   Patient Position: Sitting   Cuff Size: Adult   Pulse: 77   SpO2: 97%   Weight: 69.2 kg (152 lb 9.6 oz)   Height: 167.6 cm (66\")     Body mass index is 24.63 kg/m².    PHYSICAL EXAM:    Constitutional:       Appearance: Healthy appearance. Not in distress.   Neck:      Vascular: No JVR. JVD normal.   Pulmonary:      Effort: Pulmonary effort is normal.      Breath sounds: Normal breath sounds. No wheezing. No rhonchi. No rales.   Chest:      Chest wall: Not tender to palpatation.   Cardiovascular:      PMI at left midclavicular line. Normal rate. Regular rhythm. Normal S1. Normal S2.      Murmurs: There is no murmur.      No gallop. No click. No rub.   Pulses:     Intact distal pulses.   Edema:     Peripheral edema absent.   Abdominal:      General: Bowel sounds are normal.      Palpations: Abdomen is soft.      Tenderness: There is no abdominal tenderness.   Musculoskeletal: Normal range of motion.         General: No tenderness. Skin:     General: Skin is warm and dry.   Neurological:      General: No focal deficit present.      Mental Status: Alert and oriented to person, place and time.           ECG 12 Lead    Date/Time: " 10/27/2022 1:25 PM  Performed by: Debo Cancino APRN  Authorized by: Deob Cancino APRN   Comparison: compared with previous ECG from 10/26/2021  Rhythm: sinus rhythm  Rate: normal  BPM: 64  ST Segments: ST segments normal  T flattening: aVL, V2 and V3  QRS axis: normal                Assessment:       Diagnosis Plan   1. Paroxysmal atrial fibrillation (HCC)        2. Palpitations          Orders Placed This Encounter   Procedures   • ECG 12 Lead     This order was created via procedure documentation     Order Specific Question:   Release to patient     Answer:   Routine Release          Plan:       1.  Paroxysmal atrial fibrillation: Anticoagulation with Xarelto.  We will change metoprolol to once a day.  She notes her palpitations are usually at night and will take the metoprolol at night.  If we need to change to twice daily, we will.  I would like to give her leeway to decide best times of the day to take metoprolol.  Also recommended for patient to take OTC magnesium supplement.  2.  Vertigo: Improved with Epley maneuvers.  3.  Possible sleep apnea: Patient is scheduled for consult with Dr. Keith, with sleep medicine on 11/9/2022.    Mr. De Souza will follow up with Dr. Nunez in 6 months.  She will call sooner for any questions or concerns       Your medication list          Accurate as of October 27, 2022  1:27 PM. If you have any questions, ask your nurse or doctor.            CHANGE how you take these medications      Instructions Last Dose Given Next Dose Due   rivaroxaban 20 MG tablet  Commonly known as: Xarelto  What changed: how much to take  Changed by: ORACIO Pineda      Take 1 tablet by mouth Daily.          CONTINUE taking these medications      Instructions Last Dose Given Next Dose Due   B-12 1000 MCG capsule      Take 1,000 mcg by mouth Daily.       dilTIAZem 360 MG 24 hr capsule  Commonly known as: TIAZAC      Take 1 capsule by mouth Daily.       estradiol 10 MCG tablet  vaginal tablet  Commonly known as: VAGIFEM      Insert 1 tablet into the vagina 1 (One) Time Per Week.       famotidine 40 MG tablet  Commonly known as: Pepcid      Take 1 tablet by mouth 2 (Two) Times a Day.       lovastatin 10 MG tablet  Commonly known as: MEVACOR      Take 1 tablet by mouth.       metoprolol tartrate 25 MG tablet  Commonly known as: LOPRESSOR      Take up to twice daily as needed for palpitations.       PRESERVISION AREDS 2 PO      Take  by mouth Daily.             Where to Get Your Medications      These medications were sent to [facility] - [place], [place] - [address] AT [address] [facility] - [phone] [facility] [phone] FX  [address] [facility] [place] [address]    Phone: [phone]   · rivaroxaban 20 MG tablet     These medications were sent to [facility] [id number] - [place], [place] - [address] [facility] AT [facility] - [phone] [facility] [phone]   [address] [facility], [place] [id number]    Phone: [phone]   · dilTIAZem 360 MG 24 hr capsule  · metoprolol tartrate 25 MG tablet           As always, it has been a pleasure to participate in your patient's care.      Sincerely,       ORACIO Jones

## 2023-03-30 ENCOUNTER — TELEPHONE (OUTPATIENT)
Dept: CARDIOLOGY | Facility: CLINIC | Age: 78
End: 2023-03-30
Payer: MEDICARE

## 2023-03-30 NOTE — TELEPHONE ENCOUNTER
03/30/23  Irina with [facility]/Dr Santana called for Surgery clearance for a cataract surgery Rt Eye on 4/11/23 And  Lt Eye 4/25/23.   She states they use Conscious Sedation Versed or Fentanyl.   Please advise  Irina's call back # [phone] fax# [phone]   Thanks Alo HERNANDEZ

## 2023-03-31 NOTE — TELEPHONE ENCOUNTER
I called spoke with Irina.Let her know pt does not need clearance OK to proceed with procedure per ORACIO Moreno .   I will fax this phone message to her.   Alo HERNANDEZ

## 2023-04-25 NOTE — PROGRESS NOTES
RM:________     PCP: Geri Hensley MD    : 1945  AGE: 77 y.o.  EST PATIENT   REASON FOR VISIT/  CC:    BP Readings from Last 3 Encounters:   10/27/22 130/80   22 149/78   22 161/80        WT: ____________ BP: __________L __________R HR______    CHEST PAIN: _____________    SOA: _____________PALPS: _______________     LIGHTHEADED: ___________FATIGUE: ________________ EDEMA __________    ALLERGIES:Shellfish-derived products, Shrimp (diagnostic), Oxybutynin, Mirabegron, Other, and Penicillins SMOKING HISTORY:  Social History     Tobacco Use   • Smoking status: Never   • Smokeless tobacco: Never   • Tobacco comments:     caffeine use - very little coffee    Vaping Use   • Vaping Use: Never used   Substance Use Topics   • Alcohol use: Yes     Comment: rarely   • Drug use: No     CAFFEINE USE_________________  ALCOHOL ______________________    Below is the patient's most recent value for Albumin, ALT, AST, BUN, Calcium, Chloride, Cholesterol, CO2, Creatinine, GFR, Glucose, HDL, Hematocrit, Hemoglobin, Hemoglobin A1C, LDL, Magnesium, Phosphorus, Platelets, Potassium, PSA, Sodium, Triglycerides, TSH and WBC.   Lab Results   Component Value Date    ALBUMIN 4.2 2021    ALT 32 2021    AST 23 2021    BUN 15 2022    CALCIUM 9.5 2022     2022    CO2 26 2022    CREATININE 0.87 2022     (H) 2022    HDL 65 02/10/2020    HCT 42.7 2022    HGB 13.6 2022    HGBA1C 6.0 (H) 2022    LDL 85 02/10/2020     2022    K 4.8 2022     2022    TRIG 87 02/10/2020    TSH 0.797 2021    WBC 5.50 2022          NEW DIAGNOSIS/ SURGERY/ HOSP OR ED VISITS: ______________________    __________________________________________________________________      RECENT LABS OR DIAGNOSTIC TESTING:  _____________________________    __________________________________________________________________      ASSESSMENT/ PLAN:  _______________________________________________    __________________________________________________________________

## 2023-04-27 ENCOUNTER — OFFICE VISIT (OUTPATIENT)
Dept: CARDIOLOGY | Facility: CLINIC | Age: 78
End: 2023-04-27
Payer: MEDICARE

## 2023-04-27 VITALS
HEIGHT: 66 IN | HEART RATE: 52 BPM | DIASTOLIC BLOOD PRESSURE: 84 MMHG | SYSTOLIC BLOOD PRESSURE: 136 MMHG | BODY MASS INDEX: 25.78 KG/M2 | WEIGHT: 160.4 LBS

## 2023-04-27 DIAGNOSIS — R00.2 PALPITATIONS: Primary | ICD-10-CM

## 2023-04-27 DIAGNOSIS — I48.0 PAF (PAROXYSMAL ATRIAL FIBRILLATION): ICD-10-CM

## 2023-04-27 DIAGNOSIS — I48.92 ATRIAL FLUTTER, UNSPECIFIED TYPE: ICD-10-CM

## 2023-04-27 PROBLEM — R11.0 NAUSEA: Status: RESOLVED | Noted: 2022-06-13 | Resolved: 2023-04-27

## 2023-04-27 PROCEDURE — 1159F MED LIST DOCD IN RCRD: CPT | Performed by: INTERNAL MEDICINE

## 2023-04-27 PROCEDURE — 1160F RVW MEDS BY RX/DR IN RCRD: CPT | Performed by: INTERNAL MEDICINE

## 2023-04-27 PROCEDURE — 99214 OFFICE O/P EST MOD 30 MIN: CPT | Performed by: INTERNAL MEDICINE

## 2023-04-27 PROCEDURE — 93000 ELECTROCARDIOGRAM COMPLETE: CPT | Performed by: INTERNAL MEDICINE

## 2023-04-27 NOTE — PROGRESS NOTES
Date of Office Visit: 23  Encounter Provider: Kelvin Nunez MD  Place of Service: [facility] CARDIOLOGY  Patient Name: Rebeca De Suoza  :1945    Chief Complaint   Patient presents with   • Atrial Fibrillation   :     HPI:     Ms. De Souza is 77 y.o. and presents today to establish care. I have reviewed prior notes and there are no changes except for any new updates described below. I have also reviewed any information entered into the medical record by the patient or by ancillary staff.     In , she presented to [facility] with chest discomfort and had a normal coronary angiogram.  In , she presented with atrial fibrillation.  As there was no evidence of recurrence, she was eventually taken off of anticoagulation.  Ultimately, however, she was placed back on rivaroxaban.  In 2018, she was diagnosed with atrial flutter, and her diltiazem dose was increased.    At her last visit, she reported increasing palpitations at night.  She describes a very hard beat that is uncomfortable and causes discomfort in the center of her chest.  She is not describing a rapid heartbeat, although she did have one episode of that a few months ago.  She has been taking low-dose metoprolol at night in addition to diltiazem.  That has helped, and she has not had the symptoms now in several weeks.    She denies exertional chest pain or shortness of breath.  She is otherwise doing very well.    Past Medical History:   Diagnosis Date   • Atrial flutter    • Blood in stool    • Fracture of radius     fall 16   • Frequent UTI     on prophylactic poab   • GERD (gastroesophageal reflux disease)    • Hemorrhoid    • Hypercalcemia    • IBS (irritable bowel syndrome)    • PAF (paroxysmal atrial fibrillation)    • Parathyroid disorder     MD monitoring   • Vertigo    • Wrist pain     left        Past Surgical History:   Procedure Laterality Date   • BLADDER SURGERY      tvt sling   • CARDIAC  CATHETERIZATION     • COLONOSCOPY  10/23/2015    Non-thrombosed EH, Toruous colon, Diverticulosis, IH    • COLONOSCOPY  08/17/2010    Non-thrombosed EH, Diverticulosis, Tortuous colon, IH    • COLONOSCOPY  05/14/2007    Non thrombosed EH, Tortuous colon, One non bleeding polyp   • COLONOSCOPY N/A 5/2/2017    Procedure: COLONOSCOPY TO CECUM AND TI WITH COLD BIOPSIES POLYPECTOMY;  Surgeon: Deepak MARTINES MD;  Location: [facility] ENDOSCOPY;  Service:    • COLONOSCOPY N/A 8/26/2022    Procedure: COLONOSCOPY to cecum and TI;  Surgeon: Deepak Amezcua MD;  Location: [facility] ENDOSCOPY;  Service: Gastroenterology;  Laterality: N/A;  PRE- hx of polyps, IBS, abdominal bloating  POST- diverticulosis, hemorrhoids   • ENDOSCOPY N/A 5/2/2017    Procedure: ESOPHAGOGASTRODUODENOSCOPY  WITH COLD BIOPSY;  Surgeon: Deepak MARTINES MD;  Location: [facility] ENDOSCOPY;  Service:    • ENDOSCOPY N/A 8/26/2022    Procedure: ESOPHAGOGASTRODUODENOSCOPY with biopsies;  Surgeon: Deepak Amezcua MD;  Location: [facility] ENDOSCOPY;  Service: Gastroenterology;  Laterality: N/A;  PRE- nausea, abdominal bloating  POST- esophagitis, diffuse gastritis   • GALLBLADDER SURGERY     • HYSTERECTOMY      removed tubes & rt ovary also   • OOPHORECTOMY Left    • ORIF WRIST FRACTURE Left 12/30/2016    Procedure:  LT DISTAL RADIUS OPEN REDUCTION INTERNAL FIXATION;  Surgeon: Shawn Dorsey MD;  Location: [facility] MAIN OR;  Service:    • TONSILLECTOMY     • TONSILLECTOMY     • VARICOSE VEIN SURGERY      used wire       Social History     Socioeconomic History   • Marital status:    Tobacco Use   • Smoking status: Never   • Smokeless tobacco: Never   • Tobacco comments:     caffeine use - very little coffee    Vaping Use   • Vaping Use: Never used   Substance and Sexual Activity   • Alcohol use: Yes     Comment: rarely   • Drug use: No   • Sexual activity: Defer       Family History   Problem Relation Age of Onset   • Hypertension Mother    •  "Heart attack Father    • Hypertension Father    • Pancreatic cancer Brother    • Diabetes Brother    • Prostate cancer Brother    • Diabetes Brother    • Colon polyps Maternal Uncle    • Colon cancer Maternal Uncle    • Stroke Paternal Grandmother        Review of Systems   Hematologic/Lymphatic: Bruises/bleeds easily.   All other systems reviewed and are negative.      Allergies   Allergen Reactions   • Shellfish-Derived Products Swelling     Mouth swelled   • Shrimp (Diagnostic) Swelling   • Esomeprazole Other (See Comments)     Possibly caused renal insuff? But we are not certain   • Oxybutynin Other (See Comments)     Acute renal insufficiency  Acute renal insufficiency     • Mirabegron Palpitations     Palpitations  Palpitations     • Other Itching and Rash     Cats ringworm    • Penicillins Rash     childhood   • Trimethoprim Rash     Rash  Rash  Rash  Rash           Current Outpatient Medications:   •  dilTIAZem (TIAZAC) 360 MG 24 hr capsule, Take 1 capsule by mouth Daily., Disp: 90 capsule, Rfl: 3  •  estradiol (VAGIFEM) 10 MCG tablet vaginal tablet, Insert 1 tablet into the vagina 1 (One) Time Per Week., Disp: , Rfl:   •  famotidine (Pepcid) 40 MG tablet, Take 1 tablet by mouth 2 (Two) Times a Day., Disp: 60 tablet, Rfl: 3  •  lovastatin (MEVACOR) 10 MG tablet, Take 1 tablet by mouth., Disp: , Rfl:   •  metoprolol tartrate (LOPRESSOR) 25 MG tablet, Take up to twice daily as needed for palpitations. (Patient taking differently: Daily. Take up to twice daily as needed for palpitations.), Disp: 120 tablet, Rfl: 3  •  Multiple Vitamins-Minerals (PRESERVISION AREDS 2 PO), Take  by mouth Daily., Disp: , Rfl:   •  rivaroxaban (Xarelto) 20 MG tablet, Take 1 tablet by mouth Daily., Disp: 90 tablet, Rfl: 3      Objective:     Vitals:    04/27/23 1404   BP: 136/84   BP Location: Left arm   Pulse: 52   Weight: 72.8 kg (160 lb 6.4 oz)   Height: 167.6 cm (66\")     Body mass index is 25.89 kg/m².    Vitals reviewed. "   Constitutional:       Appearance: Well-developed and not in distress.   Eyes:      Conjunctiva/sclera: Conjunctivae normal.   HENT:      Head: Normocephalic.      Nose: Nose normal.    Mouth/Throat:      Pharynx: Oropharynx is clear.   Neck:      Vascular: No JVD. JVD normal.      Lymphadenopathy: No cervical adenopathy.   Pulmonary:      Effort: Pulmonary effort is normal.      Breath sounds: Normal breath sounds.   Cardiovascular:      Normal rate. Regular rhythm.      Murmurs: There is no murmur.   Pulses:     Intact distal pulses.   Edema:     Peripheral edema absent.   Abdominal:      Palpations: Abdomen is soft.      Tenderness: There is no abdominal tenderness.   Musculoskeletal: Normal range of motion.      Cervical back: Normal range of motion. Skin:     General: Skin is warm and dry.      Findings: No rash.   Neurological:      General: No focal deficit present.      Mental Status: Alert and oriented to person, place, and time.      Cranial Nerves: No cranial nerve deficit.   Psychiatric:         Behavior: Behavior normal.         Thought Content: Thought content normal.         Judgment: Judgment normal.           ECG 12 Lead    Date/Time: 4/27/2023 3:31 PM  Performed by: Kelvin Nunez MD  Authorized by: Kelvin Nunez MD   Comparison: compared with previous ECG   Similar to previous ECG  Rhythm: sinus rhythm  Conduction: conduction normal  ST Segments: ST segments normal  T Waves: T waves normal  QRS axis: normal  Other: no other findings    Clinical impression: normal ECG              Assessment:       Diagnosis Plan   1. Palpitations        2. PAF (paroxysmal atrial fibrillation)        3. Atrial flutter, unspecified type               Plan:       She has a history of PAF and flutter, but the palpitations that she is describing sound more like PVCs.  Regardless, taking the dose of metoprolol at night has made the symptoms go away.  I asked her to call and let me know if they return, because we could  then pursue rhythm monitoring.  I encouraged her to empirically try magnesium oxide, 240 mg daily to see if it helps.  If she is doing well, we will otherwise see her back in 6 months.  She will remain on diltiazem and rivaroxaban.    Sincerely,       Kelvin Nunez MD

## 2023-10-26 RX ORDER — DILTIAZEM HYDROCHLORIDE 360 MG/1
360 CAPSULE, EXTENDED RELEASE ORAL DAILY
Qty: 90 CAPSULE | Refills: 3 | Status: SHIPPED | OUTPATIENT
Start: 2023-10-26 | End: 2023-10-30 | Stop reason: SDUPTHER

## 2023-10-30 ENCOUNTER — OFFICE VISIT (OUTPATIENT)
Dept: CARDIOLOGY | Facility: CLINIC | Age: 78
End: 2023-10-30
Payer: MEDICARE

## 2023-10-30 VITALS
HEART RATE: 56 BPM | DIASTOLIC BLOOD PRESSURE: 76 MMHG | HEIGHT: 66 IN | WEIGHT: 163.4 LBS | BODY MASS INDEX: 26.26 KG/M2 | SYSTOLIC BLOOD PRESSURE: 130 MMHG

## 2023-10-30 DIAGNOSIS — I48.92 ATRIAL FLUTTER, UNSPECIFIED TYPE: ICD-10-CM

## 2023-10-30 DIAGNOSIS — E78.5 HYPERLIPIDEMIA LDL GOAL <100: ICD-10-CM

## 2023-10-30 DIAGNOSIS — I48.0 PAF (PAROXYSMAL ATRIAL FIBRILLATION): Primary | ICD-10-CM

## 2023-10-30 DIAGNOSIS — G47.33 OBSTRUCTIVE SLEEP APNEA: ICD-10-CM

## 2023-10-30 PROCEDURE — 99214 OFFICE O/P EST MOD 30 MIN: CPT | Performed by: NURSE PRACTITIONER

## 2023-10-30 PROCEDURE — 93000 ELECTROCARDIOGRAM COMPLETE: CPT | Performed by: NURSE PRACTITIONER

## 2023-10-30 RX ORDER — DILTIAZEM HYDROCHLORIDE 180 MG/1
180 CAPSULE, EXTENDED RELEASE ORAL DAILY
Qty: 90 CAPSULE | Refills: 3 | Status: SHIPPED | OUTPATIENT
Start: 2023-10-30

## 2023-10-30 RX ORDER — MAGNESIUM 200 MG
TABLET ORAL
COMMUNITY

## 2023-10-30 RX ORDER — UBIDECARENONE 75 MG
100 CAPSULE ORAL
COMMUNITY

## 2023-10-30 NOTE — PROGRESS NOTES
Date of Office Visit: 10/30/2023  Encounter Provider: ORACIO Pineda  Place of Service: [facility] CARDIOLOGY  Patient Name: Rebeca De Souza  :1945    No chief complaint on file.  : Follow-up    HPI: Rebeca De Souza is a 78 y.o. female who is a patient of  Dr. Nunez.  She is new to me today and presents for 6-month office follow-up.  She has a history of atrial fibrillation/flutter and hyperlipidemia.      In , she presented to [place] with chest discomfort.  At that time, she had a normal coronary angiogram.  In , she presented with atrial fibrillation.  She had no recurrence for quite some time and was taken off of anticoagulation.  Ultimately, she was placed back on Xarelto as she was again diagnosed with atrial fibrillation/flutter.  She remained rate controlled with metoprolol and diltiazem.    Today patient presents with no complaints.  She had palpitations 1 time in August while at a sewing retreat.  She has had no further episodes.  She has no complaints of lightheadedness, shortness of breath or edema.  EKG today shows sinus rhythm.  Patient notes that she has not noticed any palpitations except for the previous instance since she has started wearing CPAP for her obstructive sleep apnea.  Blood pressure is well controlled.  He also continues to take magnesium supplement daily.    Previous testing and notes have been reviewed by me.   Past Medical History:   Diagnosis Date    Atrial flutter     Blood in stool     Fracture of radius     fall 16    Frequent UTI     on prophylactic poab    GERD (gastroesophageal reflux disease)     Hemorrhoid     Hypercalcemia     IBS (irritable bowel syndrome)     PAF (paroxysmal atrial fibrillation)     Parathyroid disorder     MD monitoring    Vertigo     Wrist pain     left        Past Surgical History:   Procedure Laterality Date    BLADDER SURGERY      tvt sling    CARDIAC CATHETERIZATION      COLONOSCOPY   10/23/2015    Non-thrombosed EH, Toruous colon, Diverticulosis, IH     COLONOSCOPY  08/17/2010    Non-thrombosed EH, Diverticulosis, Tortuous colon, IH     COLONOSCOPY  05/14/2007    Non thrombosed EH, Tortuous colon, One non bleeding polyp    COLONOSCOPY N/A 5/2/2017    Procedure: COLONOSCOPY TO CECUM AND TI WITH COLD BIOPSIES POLYPECTOMY;  Surgeon: Deepak MARTINES MD;  Location: [facility] ENDOSCOPY;  Service:     COLONOSCOPY N/A 8/26/2022    Procedure: COLONOSCOPY to cecum and TI;  Surgeon: Deepak Amezcua MD;  Location: [facility] ENDOSCOPY;  Service: Gastroenterology;  Laterality: N/A;  PRE- hx of polyps, IBS, abdominal bloating  POST- diverticulosis, hemorrhoids    ENDOSCOPY N/A 5/2/2017    Procedure: ESOPHAGOGASTRODUODENOSCOPY  WITH COLD BIOPSY;  Surgeon: Deepak MARTINES MD;  Location: [facility] ENDOSCOPY;  Service:     ENDOSCOPY N/A 8/26/2022    Procedure: ESOPHAGOGASTRODUODENOSCOPY with biopsies;  Surgeon: Deepak Amezcua MD;  Location: [facility] ENDOSCOPY;  Service: Gastroenterology;  Laterality: N/A;  PRE- nausea, abdominal bloating  POST- esophagitis, diffuse gastritis    GALLBLADDER SURGERY      HYSTERECTOMY      removed tubes & rt ovary also    OOPHORECTOMY Left     ORIF WRIST FRACTURE Left 12/30/2016    Procedure:  LT DISTAL RADIUS OPEN REDUCTION INTERNAL FIXATION;  Surgeon: Shawn Dorsey MD;  Location: [facility] MAIN OR;  Service:     TONSILLECTOMY      TONSILLECTOMY      VARICOSE VEIN SURGERY      used wire       Social History     Socioeconomic History    Marital status:    Tobacco Use    Smoking status: Never    Smokeless tobacco: Never    Tobacco comments:     caffeine use - very little coffee    Vaping Use    Vaping Use: Never used   Substance and Sexual Activity    Alcohol use: Yes     Comment: rarely    Drug use: No    Sexual activity: Defer       Family History   Problem Relation Age of Onset    Hypertension Mother     Heart attack Father     Hypertension Father     Pancreatic  cancer Brother     Diabetes Brother     Prostate cancer Brother     Diabetes Brother     Colon polyps Maternal Uncle     Colon cancer Maternal Uncle     Stroke Paternal Grandmother        Review of Systems   Constitutional: Negative.   HENT: Negative.     Eyes: Negative.    Cardiovascular: Negative.    Respiratory: Negative.     Endocrine: Negative.    Hematologic/Lymphatic:        Xarelto 20mg daily   Skin: Negative.    Musculoskeletal: Negative.    Gastrointestinal: Negative.    Genitourinary: Negative.    Neurological: Negative.    Psychiatric/Behavioral: Negative.     Allergic/Immunologic: Negative.        Allergies   Allergen Reactions    Shellfish-Derived Products Swelling     Mouth swelled    Shrimp (Diagnostic) Swelling    Esomeprazole Other (See Comments)     Possibly caused renal insuff? But we are not certain    Oxybutynin Other (See Comments)     Acute renal insufficiency  Acute renal insufficiency      Mirabegron Palpitations     Palpitations  Palpitations      Other Itching and Rash     Cats ringworm     Penicillins Rash     childhood    Trimethoprim Rash     Rash  Rash  Rash  Rash           Current Outpatient Medications:     dilTIAZem (TIAZAC) 180 MG 24 hr capsule, Take 1 capsule by mouth Daily., Disp: 90 capsule, Rfl: 3    estradiol (VAGIFEM) 10 MCG tablet vaginal tablet, Insert 1 tablet into the vagina 1 (One) Time Per Week., Disp: , Rfl:     famotidine (Pepcid) 40 MG tablet, Take 1 tablet by mouth 2 (Two) Times a Day., Disp: 60 tablet, Rfl: 3    lovastatin (MEVACOR) 10 MG tablet, Take 1 tablet by mouth., Disp: , Rfl:     Magnesium 200 MG tablet, Take  by mouth., Disp: , Rfl:     metoprolol tartrate (LOPRESSOR) 25 MG tablet, Take 1 tablet by mouth 2 (Two) Times a Day. Take one tablet daily.  May take up to twice daily as needed for palpitations., Disp: 100 tablet, Rfl: 3    Multiple Vitamins-Minerals (PRESERVISION AREDS 2 PO), Take  by mouth Daily., Disp: , Rfl:     rivaroxaban (Xarelto) 20 MG  "tablet, Take 1 tablet by mouth Daily., Disp: 90 tablet, Rfl: 3    vitamin B-12 (cyanocobalamin) 100 MCG tablet, Take 1 tablet by mouth., Disp: , Rfl:     Vitamin B-2 (RIBOFLAVIN) 100 MG tablet tablet, Take  by mouth., Disp: , Rfl:       Objective:     Vitals:    10/30/23 1138   BP: 130/76   Pulse: 56   Weight: 74.1 kg (163 lb 6.4 oz)   Height: 167.6 cm (65.98\")     Body mass index is 26.39 kg/m².    2D Echocardiogram 7/9/2018:  Left ventricular systolic function is normal. Calculated EF = 62%. Estimated EF was in agreement with the calculated EF. Estimated EF = 62%. Left ventricular diastolic dysfunction is noted (grade I) consistent with impaired relaxation.  Trace mitral valve regurgitation is present.  Physiologic tricuspid valve regurgitation is present. Insufficient TR velocity profile to estimate the right ventricular systolic pressure.     Treadmill EKG Stress test 07/09/2018:  The Duke Treadmill Score of 9 is consistent with a Low risk for ischemic heart disease.  No ECG evidence of myocardial ischemia.Negative clinical evidence of myocardial ischemia.      30 Day Event monitor 11/12/2015:   Patient wore a 30 day event monitor.  A total of 11 transmissions were sent.  These all correlated to normal sinus rhythm.  There were episodes of premature atrial beats.  No sustained or nonsustained atrial arrhythmias noted.      PHYSICAL EXAM:    Constitutional:       Appearance: Healthy appearance. Not in distress.   Neck:      Vascular: No JVR. JVD normal.   Pulmonary:      Effort: Pulmonary effort is normal.      Breath sounds: Normal breath sounds. No wheezing. No rhonchi. No rales.   Chest:      Chest wall: Not tender to palpatation.   Cardiovascular:      PMI at left midclavicular line. Normal rate. Regular rhythm. Normal S1. Normal S2.       Murmurs: There is no murmur.      No gallop.  No click. No rub.   Pulses:     Intact distal pulses.   Edema:     Peripheral edema absent.   Abdominal:      General: Bowel " sounds are normal.      Palpations: Abdomen is soft.      Tenderness: There is no abdominal tenderness.   Musculoskeletal: Normal range of motion.         General: No tenderness. Skin:     General: Skin is warm and dry.   Neurological:      General: No focal deficit present.      Mental Status: Alert and oriented to person, place and time.           ECG 12 Lead    Date/Time: 10/30/2023 12:32 PM  Performed by: Debo Cancino APRN    Authorized by: Debo Cancino APRN  Comparison: compared with previous ECG from 4/27/2023  Similar to previous ECG  Rhythm: sinus rhythm  BPM: 56  Conduction: conduction normal            Assessment:       Diagnosis Plan   1. PAF (paroxysmal atrial fibrillation)        2. Atrial flutter, unspecified type        3. Hyperlipidemia LDL goal <100        4. Obstructive sleep apnea          No orders of the defined types were placed in this encounter.         Plan:       1.  Paroxysmal atrial fibrillation/flutter: Sinus rhythm on EKG today.  Anticoagulation with Xarelto.  Rate control with metoprolol and diltiazem. She is only taking metoprolol tartrate once daily.  Will decrease diltiazem to 180mg daily.    2.  Palpitations: one isolated instance when she was at a sewing retreat in August.  No recurrence.  Palpitations have subsided since she began wearing CPAP for sleep apnea.  3.  Hypertension: Lipid panel in target range.  On statin therapy.  Followed by PCP.  4.  Obstructive sleep apnea: compliant with CPAP    Follow-up with Dr. Nunez in 6 months.  Offered to place patient on 1 week Holter to check for atrial fibrillation and if no recurrence, take her off of anticoagulation.  She declined at this time.       Your medication list            Accurate as of October 30, 2023 12:24 PM. If you have any questions, ask your nurse or doctor.                CHANGE how you take these medications        Instructions Last Dose Given Next Dose Due   dilTIAZem 180 MG 24 hr capsule  Commonly  known as: TIAZAC  What changed:   medication strength  how much to take  Changed by: ORACIO Pineda      Take 1 capsule by mouth Daily.       metoprolol tartrate 25 MG tablet  Commonly known as: LOPRESSOR  What changed:   how much to take  how to take this  when to take this  additional instructions  Changed by: ORACIO Pineda      Take 1 tablet by mouth 2 (Two) Times a Day. Take one tablet daily.  May take up to twice daily as needed for palpitations.              CONTINUE taking these medications        Instructions Last Dose Given Next Dose Due   estradiol 10 MCG tablet vaginal tablet  Commonly known as: VAGIFEM      Insert 1 tablet into the vagina 1 (One) Time Per Week.       famotidine 40 MG tablet  Commonly known as: Pepcid      Take 1 tablet by mouth 2 (Two) Times a Day.       lovastatin 10 MG tablet  Commonly known as: MEVACOR      Take 1 tablet by mouth.       Magnesium 200 MG tablet      Take  by mouth.       PRESERVISION AREDS 2 PO      Take  by mouth Daily.       rivaroxaban 20 MG tablet  Commonly known as: Xarelto      Take 1 tablet by mouth Daily.       vitamin B-12 100 MCG tablet  Commonly known as: CYANOCOBALAMIN      Take 1 tablet by mouth.       Vitamin B-2 100 MG tablet tablet  Commonly known as: RIBOFLAVIN      Take  by mouth.                 Where to Get Your Medications        These medications were sent to [facility] [id number] - [facility] [address] AT [facility] - [phone]  - [phone]   [address] [facility] [address]      Phone: [phone]   dilTIAZem 180 MG 24 hr capsule  metoprolol tartrate 25 MG tablet           As always, it has been a pleasure to participate in your patient's care.      Sincerely,       ORACIO Jones

## 2023-12-21 RX ORDER — RIVAROXABAN 20 MG/1
20 TABLET, FILM COATED ORAL DAILY
Qty: 90 TABLET | Refills: 3 | Status: SHIPPED | OUTPATIENT
Start: 2023-12-21

## 2024-01-12 NOTE — DISCHARGE INSTRUCTIONS
Drink plenty of fluids.  Take medication as prescribed.  Avoid sudden movements.  Follow-up with your primary care doctor later this week if symptoms persist.  Return to emergency department for worsening dizziness, inability to ambulate, chest pain, shortness of breath, palpitations, or other concern.  Follow-up with your cardiologist tomorrow as scheduled.   Yes - the patient is able to be screened

## 2024-01-25 ENCOUNTER — TELEPHONE (OUTPATIENT)
Dept: CARDIOLOGY | Facility: CLINIC | Age: 79
End: 2024-01-25
Payer: MEDICARE

## 2024-01-25 RX ORDER — METOPROLOL SUCCINATE 25 MG/1
25 TABLET, EXTENDED RELEASE ORAL DAILY
Qty: 30 TABLET | Refills: 11 | Status: SHIPPED | OUTPATIENT
Start: 2024-01-25

## 2024-01-25 NOTE — TELEPHONE ENCOUNTER
Dr. Nunez Pt:  Out of office: hospital   Returning:   EST APC: Dbeo Cancino     Please advise.       Pt called she states that she use to be on Diltiazem 360 mg and was advised to decrease to Diltiazem 180 mg daily.  Pt reports that he bp has continue to become elevated ranging from 150 - 170 sys.  She checks her bp once daily; however she reports that her hr is running in the 50's and feels fatigued.  Pt also reports that is taking her Metoprolol 25 mg daily vs bid.      Does pt need to make any changes to her medication?

## 2024-01-29 NOTE — TELEPHONE ENCOUNTER
Pt declined changing her Metoprolol tartrate 25 mg daily and would like to continue with the increased dose of Diltiazem 360 mg not the Diltiazem 180 mg that was recommended.  Pt states that she has been taking the Diltiazem 360 mg daily and her bp had been under control and she will see our office in May.

## 2024-01-30 NOTE — TELEPHONE ENCOUNTER
Last office visit, diltiazem was decreased to 180 mg daily.  If she is having issues with her heart rate being low and her blood pressure being high, we need to lydia in on lowering the BP and keep the decrease diltiazem as we don't want to decrease her rate anymore.  I will defer to primary cardiologist/ NP.

## 2024-02-23 ENCOUNTER — OFFICE VISIT (OUTPATIENT)
Dept: CARDIOLOGY | Facility: CLINIC | Age: 79
End: 2024-02-23
Payer: MEDICARE

## 2024-02-23 VITALS
HEIGHT: 66 IN | HEART RATE: 59 BPM | BODY MASS INDEX: 26.48 KG/M2 | OXYGEN SATURATION: 98 % | WEIGHT: 164.8 LBS | DIASTOLIC BLOOD PRESSURE: 88 MMHG | SYSTOLIC BLOOD PRESSURE: 172 MMHG

## 2024-02-23 DIAGNOSIS — I10 PRIMARY HYPERTENSION: Primary | ICD-10-CM

## 2024-02-23 DIAGNOSIS — G47.33 OBSTRUCTIVE SLEEP APNEA: ICD-10-CM

## 2024-02-23 DIAGNOSIS — R00.0 TACHYCARDIA: ICD-10-CM

## 2024-02-23 DIAGNOSIS — I48.0 PAF (PAROXYSMAL ATRIAL FIBRILLATION): ICD-10-CM

## 2024-02-23 DIAGNOSIS — I48.92 ATRIAL FLUTTER, UNSPECIFIED TYPE: ICD-10-CM

## 2024-02-23 PROCEDURE — 1159F MED LIST DOCD IN RCRD: CPT | Performed by: NURSE PRACTITIONER

## 2024-02-23 PROCEDURE — 99214 OFFICE O/P EST MOD 30 MIN: CPT | Performed by: NURSE PRACTITIONER

## 2024-02-23 PROCEDURE — 93000 ELECTROCARDIOGRAM COMPLETE: CPT | Performed by: NURSE PRACTITIONER

## 2024-02-23 PROCEDURE — 1160F RVW MEDS BY RX/DR IN RCRD: CPT | Performed by: NURSE PRACTITIONER

## 2024-02-23 RX ORDER — VALSARTAN 80 MG/1
80 TABLET ORAL DAILY
Qty: 90 TABLET | Refills: 0 | Status: SHIPPED | OUTPATIENT
Start: 2024-02-23

## 2024-02-23 RX ORDER — DILTIAZEM HYDROCHLORIDE 360 MG/1
360 CAPSULE, EXTENDED RELEASE ORAL DAILY
COMMUNITY

## 2024-02-23 NOTE — PROGRESS NOTES
Date of Office Visit: 2024  Encounter Provider: ORACIO Kramer  Place of Service: [facility] CARDIOLOGY  Patient Name: Rebeca De Souza  :1945  Primary Cardiologist: Dr. Kelvin Nunez     Chief Complaint   Patient presents with    Follow-up    Rapid Heart Rate    Atrial Fibrillation   :     HPI: Rebeca De Souza is a 78 y.o. female who presents today for evaluation of hypertension.  She is a new patient to me and I have reviewed her medical records.    She has known hypertension and obstructive sleep apnea on CPAP.    In , she presented to [facility] with chest discomfort and coronary angiogram was normal.  In , she was diagnosed with atrial fibrillation.  There was no evidence of recurrence, so she was eventually taken off anticoagulation.  At some point she was restarted on rivaroxaban.  In , she was diagnosed with atrial flutter.    In 2023, she saw Dr. Nunez and was having palpitations.  She was recommended continue diltiazem, rivaroxaban, and magnesium.    In 2024, she was experiencing elevated heart rates.  ORACIO Jones changed her metoprolol tartrate to metoprolol succinate 25 mg daily.  Patient declined doing this.  She wanted to increase her diltiazem to 360 mg daily.    She presents today with concerns of elevated blood pressures running 150-170s in January.  She ended up increasing her diltiazem to 360 mg.  Blood pressure still runs high at times in the 140-160s.  Blood pressure elevated today in the office.  She denies chest pain, shortness of air, palpitations, edema, dizziness, or syncope.  She has been using her CPAP machine.  She reports some hip pain.      Past Medical History:   Diagnosis Date    Atrial flutter     Blood in stool     Fracture of radius     fall 16    Frequent UTI     on prophylactic poab    GERD (gastroesophageal reflux disease)     Hemorrhoid     Hypercalcemia     IBS (irritable bowel  syndrome)     PAF (paroxysmal atrial fibrillation)     Parathyroid disorder     MD monitoring    Vertigo     Wrist pain     left        Past Surgical History:   Procedure Laterality Date    BLADDER SURGERY      tvt sling    CARDIAC CATHETERIZATION      COLONOSCOPY  10/23/2015    Non-thrombosed EH, Toruous colon, Diverticulosis, IH     COLONOSCOPY  08/17/2010    Non-thrombosed EH, Diverticulosis, Tortuous colon, IH     COLONOSCOPY  05/14/2007    Non thrombosed EH, Tortuous colon, One non bleeding polyp    COLONOSCOPY N/A 5/2/2017    Procedure: COLONOSCOPY TO CECUM AND TI WITH COLD BIOPSIES POLYPECTOMY;  Surgeon: Deepak MARTINES MD;  Location: [facility] ENDOSCOPY;  Service:     COLONOSCOPY N/A 8/26/2022    Procedure: COLONOSCOPY to cecum and TI;  Surgeon: Deepak Amezcua MD;  Location: [facility] ENDOSCOPY;  Service: Gastroenterology;  Laterality: N/A;  PRE- hx of polyps, IBS, abdominal bloating  POST- diverticulosis, hemorrhoids    ENDOSCOPY N/A 5/2/2017    Procedure: ESOPHAGOGASTRODUODENOSCOPY  WITH COLD BIOPSY;  Surgeon: Deepak MARTINES MD;  Location: [facility] ENDOSCOPY;  Service:     ENDOSCOPY N/A 8/26/2022    Procedure: ESOPHAGOGASTRODUODENOSCOPY with biopsies;  Surgeon: Deepak Amezcua MD;  Location: [facility] ENDOSCOPY;  Service: Gastroenterology;  Laterality: N/A;  PRE- nausea, abdominal bloating  POST- esophagitis, diffuse gastritis    GALLBLADDER SURGERY      HYSTERECTOMY      removed tubes & rt ovary also    OOPHORECTOMY Left     ORIF WRIST FRACTURE Left 12/30/2016    Procedure:  LT DISTAL RADIUS OPEN REDUCTION INTERNAL FIXATION;  Surgeon: Shawn Dorsey MD;  Location: [facility] MAIN OR;  Service:     TONSILLECTOMY      TONSILLECTOMY      VARICOSE VEIN SURGERY      used wire       Social History     Socioeconomic History    Marital status:    Tobacco Use    Smoking status: Never    Smokeless tobacco: Never    Tobacco comments:     caffeine use - very little coffee    Vaping Use    Vaping Use:  Never used   Substance and Sexual Activity    Alcohol use: Yes     Comment: rarely    Drug use: No    Sexual activity: Defer       Family History   Problem Relation Age of Onset    Hypertension Mother     Heart attack Father     Hypertension Father     Pancreatic cancer Brother     Diabetes Brother     Prostate cancer Brother     Diabetes Brother     Colon polyps Maternal Uncle     Colon cancer Maternal Uncle     Stroke Paternal Grandmother        The following portion of the patient's history were reviewed and updated as appropriate: past medical history, past surgical history, past social history, past family history, allergies, current medications, and problem list.    Review of Systems   Constitutional: Negative.   Cardiovascular: Negative.    Respiratory: Negative.     Musculoskeletal:  Positive for joint pain.   Neurological: Negative.        Allergies   Allergen Reactions    Shellfish-Derived Products Swelling     Mouth swelled    Shrimp (Diagnostic) Swelling    Esomeprazole Other (See Comments)     Possibly caused renal insuff? But we are not certain    Oxybutynin Other (See Comments)     Acute renal insufficiency  Acute renal insufficiency      Mirabegron Palpitations     Palpitations  Palpitations      Other Itching and Rash     Cats ringworm     Penicillins Rash     childhood    Trimethoprim Rash     Rash  Rash  Rash  Rash           Current Outpatient Medications:     dilTIAZem CD (CARDIZEM CD) 360 MG 24 hr capsule, Take 1 capsule by mouth Daily., Disp: , Rfl:     estradiol (VAGIFEM) 10 MCG tablet vaginal tablet, Insert 1 tablet into the vagina 1 (One) Time Per Week., Disp: , Rfl:     famotidine (Pepcid) 40 MG tablet, Take 1 tablet by mouth 2 (Two) Times a Day., Disp: 60 tablet, Rfl: 3    lovastatin (MEVACOR) 10 MG tablet, Take 1 tablet by mouth., Disp: , Rfl:     Magnesium 200 MG tablet, Take  by mouth., Disp: , Rfl:     metoprolol succinate XL (TOPROL-XL) 25 MG 24 hr tablet, Take 1 tablet by mouth  "Daily., Disp: 30 tablet, Rfl: 11    Multiple Vitamins-Minerals (PRESERVISION AREDS 2 PO), Take  by mouth Daily., Disp: , Rfl:     vitamin B-12 (cyanocobalamin) 100 MCG tablet, Take 1 tablet by mouth., Disp: , Rfl:     Vitamin B-2 (RIBOFLAVIN) 100 MG tablet tablet, Take  by mouth., Disp: , Rfl:     Xarelto 20 MG tablet, TAKE 1 TABLET DAILY, Disp: 90 tablet, Rfl: 3          Objective:     Vitals:    02/23/24 1329 02/23/24 1405 02/23/24 1406   BP: 142/88 170/88 172/88   BP Location: Right arm Left arm Right arm   Patient Position: Sitting Sitting Sitting   Cuff Size: Adult     Pulse: 59     SpO2: 98%     Weight: 74.8 kg (164 lb 12.8 oz)     Height: 167.6 cm (65.98\")       Body mass index is 26.61 kg/m².    PHYSICAL EXAM:    Vitals Reviewed.   General Appearance: No acute distress, well developed and well nourished.   HENT: No hearing loss noted.    Respiratory: No signs of respiratory distress. Respiration rhythm and depth normal.  Clear to auscultation.   Cardiovascular:  Jugular Venous Pressure: Normal  Heart Rate and Rhythm: Normal, Heart Sounds: Normal S1 and S2. No S3 or S4 noted.  Murmurs: No murmurs noted. No rubs, thrills, or gallops.   Lower Extremities: No edema noted.  Musculoskeletal: Normal movement of extremities.  Skin: General appearance normal.    Psychiatric: Patient alert and oriented to person, place, and time. Speech and behavior appropriate. Normal mood and affect.       ECG 12 Lead    Date/Time: 2/23/2024 1:30 PM  Performed by: Skye Pulido APRN    Authorized by: Skye Pulido APRN  Comparison: compared with previous ECG from 10/30/2023  Similar to previous ECG  Rhythm: sinus bradycardia  Rate: bradycardic  BPM: 59  Conduction: conduction normal  ST Segments: ST segments normal  T Waves: T waves normal  QRS axis: normal    Clinical impression: normal ECG  Comments: RSR V1 prime            Assessment:       Diagnosis Plan   1. Primary hypertension  Basic Metabolic Panel    ECG 12 Lead    "   2. PAF (paroxysmal atrial fibrillation)        3. Atrial flutter, unspecified type        4. Tachycardia        5. Obstructive sleep apnea               Plan:       1.  Hypertension: Start valsartan 80 mg daily, BMP in 7 to 10 days at main lab, follow low-salt diet, call with any concerns before then, follow-up with me in 1 month    2/3/4.  Paroxysmal Atrial Fibrillation/Flutter: Atrial fibrillation diagnosed in 2015.  Atrial flutter diagnosed in 2018.  Doing well on current dose of diltiazem. [id number] score of 4.  Continue rivaroxaban.    5.  Obstructive sleep apnea on CPAP.    6.  She will call with any further concerns.  Follow-up with me in 1 month.    As always, it has been a pleasure to participate in your patient's care. Thank you.         Sincerely,         ORACIO Plummer  [facility]      Dictated utilizing Dragon Dictation

## 2024-03-07 ENCOUNTER — LAB (OUTPATIENT)
Dept: LAB | Facility: HOSPITAL | Age: 79
End: 2024-03-07
Payer: MEDICARE

## 2024-03-07 DIAGNOSIS — I10 PRIMARY HYPERTENSION: ICD-10-CM

## 2024-03-07 LAB
ANION GAP SERPL CALCULATED.3IONS-SCNC: 11.1 MMOL/L (ref 5–15)
BUN SERPL-MCNC: 15 MG/DL (ref 8–23)
BUN/CREAT SERPL: 14.9 (ref 7–25)
CALCIUM SPEC-SCNC: 9.6 MG/DL (ref 8.6–10.5)
CHLORIDE SERPL-SCNC: 107 MMOL/L (ref 98–107)
CO2 SERPL-SCNC: 23.9 MMOL/L (ref 22–29)
CREAT SERPL-MCNC: 1.01 MG/DL (ref 0.57–1)
EGFRCR SERPLBLD CKD-EPI 2021: 57.8 ML/MIN/1.73
GLUCOSE SERPL-MCNC: 106 MG/DL (ref 65–99)
POTASSIUM SERPL-SCNC: 4.7 MMOL/L (ref 3.5–5.2)
SODIUM SERPL-SCNC: 142 MMOL/L (ref 136–145)

## 2024-03-07 PROCEDURE — 80048 BASIC METABOLIC PNL TOTAL CA: CPT

## 2024-03-07 PROCEDURE — 36415 COLL VENOUS BLD VENIPUNCTURE: CPT

## 2024-03-07 NOTE — PROGRESS NOTES
I recently started her on valsartan 80 mg daily.  BMP normal except for glucose of 106.  Kidney function and potassium look good.  Please reassess her home blood pressures.  If running 130s/80s or less, continue with current medicine.  Thank you

## 2024-03-08 ENCOUNTER — TELEPHONE (OUTPATIENT)
Dept: CARDIOLOGY | Facility: CLINIC | Age: 79
End: 2024-03-08
Payer: MEDICARE

## 2024-03-08 NOTE — TELEPHONE ENCOUNTER
Spoke with pt, she verbalized understanding of test results.  She states she is taking her valsartan daily and her BP's have been running 120's/60-70's.

## 2024-03-08 NOTE — TELEPHONE ENCOUNTER
----- Message from ORACIO Teran sent at 3/7/2024  4:23 PM EST -----  I recently started her on valsartan 80 mg daily.  BMP normal except for glucose of 106.  Kidney function and potassium look good.  Please reassess her home blood pressures.  If running 130s/80s or less, continue with current medicine.  Thank you

## 2024-03-26 ENCOUNTER — OFFICE VISIT (OUTPATIENT)
Dept: CARDIOLOGY | Facility: CLINIC | Age: 79
End: 2024-03-26
Payer: MEDICARE

## 2024-03-26 VITALS
HEIGHT: 66 IN | WEIGHT: 164.2 LBS | SYSTOLIC BLOOD PRESSURE: 145 MMHG | DIASTOLIC BLOOD PRESSURE: 64 MMHG | BODY MASS INDEX: 26.39 KG/M2 | HEART RATE: 53 BPM

## 2024-03-26 DIAGNOSIS — G47.33 OBSTRUCTIVE SLEEP APNEA: ICD-10-CM

## 2024-03-26 DIAGNOSIS — I48.92 ATRIAL FLUTTER, UNSPECIFIED TYPE: ICD-10-CM

## 2024-03-26 DIAGNOSIS — I10 PRIMARY HYPERTENSION: Primary | ICD-10-CM

## 2024-03-26 DIAGNOSIS — I48.0 PAF (PAROXYSMAL ATRIAL FIBRILLATION): ICD-10-CM

## 2024-03-26 PROCEDURE — 93000 ELECTROCARDIOGRAM COMPLETE: CPT | Performed by: NURSE PRACTITIONER

## 2024-03-26 PROCEDURE — 99214 OFFICE O/P EST MOD 30 MIN: CPT | Performed by: NURSE PRACTITIONER

## 2024-03-26 PROCEDURE — 1160F RVW MEDS BY RX/DR IN RCRD: CPT | Performed by: NURSE PRACTITIONER

## 2024-03-26 PROCEDURE — 1159F MED LIST DOCD IN RCRD: CPT | Performed by: NURSE PRACTITIONER

## 2024-03-26 RX ORDER — VALSARTAN 80 MG/1
80 TABLET ORAL DAILY
Qty: 90 TABLET | Refills: 3 | Status: SHIPPED | OUTPATIENT
Start: 2024-03-26

## 2024-03-26 NOTE — PROGRESS NOTES
Date of Office Visit: 2024  Encounter Provider: ORACIO Kramer  Place of Service: [facility] CARDIOLOGY  Patient Name: Rebeca De Souza  :1945  Primary Cardiologist: Dr. Kelvin Nunez     Chief Complaint   Patient presents with    Atrial Fibrillation    Hypertension   :     HPI: Rebeca De Souza is a 78 y.o. female who presents today for cardiac follow up visit. I have reviewed her medical records.     In , she presented to [facility] with chest discomfort and coronary angiogram was normal.  In , she was diagnosed with atrial fibrillation.  There was no evidence of recurrence, so she was eventually taken off anticoagulation.  At some point she was restarted on rivaroxaban.  In , she was diagnosed with atrial flutter.     In 2023, she saw Dr. Nunez and was having palpitations.  She was recommended continue diltiazem, rivaroxaban, and magnesium.    In 2024, she was experiencing elevated heart rates and diltiazem was increased.  In 2024, she followed up with me and blood pressures were elevated.  I added valsartan 80 mg daily and her blood pressures improved.  Repeat BMP showed normal kidney function and potassium.    She presents today for follow-up visit.  Her blood pressure has been running 120-130/60-70s.  Blood pressure little elevated today and she is having some back pain.  She experiences lightheadedness when she lays down or sits up quickly.  She does not experience lightheadedness when standing or walking.  She denies chest pain, shortness of air, palpitations, syncope, or bleeding.      Past Medical History:   Diagnosis Date    Atrial flutter     Blood in stool     Fracture of radius     fall 16    Frequent UTI     on prophylactic poab    GERD (gastroesophageal reflux disease)     Hemorrhoid     Hypercalcemia     IBS (irritable bowel syndrome)     PAF (paroxysmal atrial fibrillation)     Parathyroid disorder      MD monitoring    Vertigo     Wrist pain     left        Past Surgical History:   Procedure Laterality Date    BLADDER SURGERY      tvt sling    CARDIAC CATHETERIZATION      COLONOSCOPY  10/23/2015    Non-thrombosed EH, Toruous colon, Diverticulosis, IH     COLONOSCOPY  08/17/2010    Non-thrombosed EH, Diverticulosis, Tortuous colon, IH     COLONOSCOPY  05/14/2007    Non thrombosed EH, Tortuous colon, One non bleeding polyp    COLONOSCOPY N/A 5/2/2017    Procedure: COLONOSCOPY TO CECUM AND TI WITH COLD BIOPSIES POLYPECTOMY;  Surgeon: Depeak MARTINES MD;  Location[facility] ENDOSCOPY;  Service:     COLONOSCOPY N/A 8/26/2022    Procedure: COLONOSCOPY to cecum and TI;  Surgeon: Deepak Amezcua MD;  Location: [facility] ENDOSCOPY;  Service: Gastroenterology;  Laterality: N/A;  PRE- hx of polyps, IBS, abdominal bloating  POST- diverticulosis, hemorrhoids    ENDOSCOPY N/A 5/2/2017    Procedure: ESOPHAGOGASTRODUODENOSCOPY  WITH COLD BIOPSY;  Surgeon: Deepak MARTINES MD;  Location: [facility] ENDOSCOPY;  Service:     ENDOSCOPY N/A 8/26/2022    Procedure: ESOPHAGOGASTRODUODENOSCOPY with biopsies;  Surgeon: Deepak Amezcua MD;  Location: [facility] ENDOSCOPY;  Service: Gastroenterology;  Laterality: N/A;  PRE- nausea, abdominal bloating  POST- esophagitis, diffuse gastritis    GALLBLADDER SURGERY      HYSTERECTOMY      removed tubes & rt ovary also    OOPHORECTOMY Left     ORIF WRIST FRACTURE Left 12/30/2016    Procedure:  LT DISTAL RADIUS OPEN REDUCTION INTERNAL FIXATION;  Surgeon: Shawn Dorsey MD;  Location: [facility] MAIN OR;  Service:     TONSILLECTOMY      TONSILLECTOMY      VARICOSE VEIN SURGERY      used wire       Social History     Socioeconomic History    Marital status:    Tobacco Use    Smoking status: Never    Smokeless tobacco: Never    Tobacco comments:     caffeine use - very little coffee    Vaping Use    Vaping status: Never Used   Substance and Sexual Activity    Alcohol use: Yes     Comment:  rarely    Drug use: No    Sexual activity: Defer       Family History   Problem Relation Age of Onset    Hypertension Mother     Heart attack Father     Hypertension Father     Pancreatic cancer Brother     Diabetes Brother     Prostate cancer Brother     Diabetes Brother     Colon polyps Maternal Uncle     Colon cancer Maternal Uncle     Stroke Paternal Grandmother        The following portion of the patient's history were reviewed and updated as appropriate: past medical history, past surgical history, past social history, past family history, allergies, current medications, and problem list.    Review of Systems   Constitutional: Negative.   Cardiovascular: Negative.    Respiratory: Negative.     Musculoskeletal:  Positive for back pain.   Neurological:  Positive for light-headedness.       Allergies   Allergen Reactions    Shellfish-Derived Products Swelling     Mouth swelled    Shrimp (Diagnostic) Swelling    Esomeprazole Other (See Comments)     Possibly caused renal insuff? But we are not certain    Oxybutynin Other (See Comments)     Acute renal insufficiency  Acute renal insufficiency      Mirabegron Palpitations     Palpitations  Palpitations      Other Itching and Rash     Cats ringworm     Penicillins Rash     childhood    Trimethoprim Rash     Rash  Rash  Rash  Rash           Current Outpatient Medications:     dilTIAZem CD (CARDIZEM CD) 360 MG 24 hr capsule, Take 1 capsule by mouth Daily., Disp: , Rfl:     estradiol (VAGIFEM) 10 MCG tablet vaginal tablet, Insert 1 tablet into the vagina 1 (One) Time Per Week., Disp: , Rfl:     famotidine (Pepcid) 40 MG tablet, Take 1 tablet by mouth 2 (Two) Times a Day., Disp: 60 tablet, Rfl: 3    lovastatin (MEVACOR) 10 MG tablet, Take 1 tablet by mouth., Disp: , Rfl:     Magnesium 200 MG tablet, Take  by mouth., Disp: , Rfl:     metoprolol succinate XL (TOPROL-XL) 25 MG 24 hr tablet, Take 1 tablet by mouth Daily., Disp: 30 tablet, Rfl: 11    Multiple  "Vitamins-Minerals (PRESERVISION AREDS 2 PO), Take  by mouth Daily., Disp: , Rfl:     valsartan (DIOVAN) 80 MG tablet, Take 1 tablet by mouth Daily., Disp: 90 tablet, Rfl: 3    vitamin B-12 (cyanocobalamin) 100 MCG tablet, Take 1 tablet by mouth., Disp: , Rfl:     Vitamin B-2 (RIBOFLAVIN) 100 MG tablet tablet, Take  by mouth., Disp: , Rfl:     Xarelto 20 MG tablet, TAKE 1 TABLET DAILY, Disp: 90 tablet, Rfl: 3         Objective:     Vitals:    03/26/24 1134 03/26/24 1140   BP: 142/72 145/64   BP Location: Left arm Left arm   Patient Position: Sitting Sitting   Cuff Size: Adult Adult   Pulse: 53    Weight: 74.5 kg (164 lb 3.2 oz)    Height: 167.6 cm (65.98\")      Body mass index is 26.52 kg/m².    PHYSICAL EXAM:    Vitals Reviewed.   General Appearance: No acute distress, well developed and well nourished.   HENT: No hearing loss noted.    Respiratory: No signs of respiratory distress. Respiration rhythm and depth normal.  Clear to auscultation.   Cardiovascular:  Jugular Venous Pressure: Normal  Heart Rate and Rhythm: Normal, Heart Sounds: Normal S1 and S2. No S3 or S4 noted.  Murmurs: No murmurs noted. No rubs, thrills, or gallops.   Lower Extremities: No edema noted.  Musculoskeletal: Normal movement of extremities.  Skin: General appearance normal.    Psychiatric: Patient alert and oriented to person, place, and time. Speech and behavior appropriate. Normal mood and affect.       ECG 12 Lead    Date/Time: 3/26/2024 11:34 AM  Performed by: Skye Pulido APRN    Authorized by: Skye Pulido APRN  Comparison: compared with previous ECG from 10/30/2023  Similar to previous ECG  Rhythm: sinus bradycardia  Rate: bradycardic  BPM: 53  Conduction: conduction normal  ST Segments: ST segments normal  T Waves: T waves normal  QRS axis: normal    Clinical impression: normal ECG            Assessment:       Diagnosis Plan   1. Primary hypertension        2. PAF (paroxysmal atrial fibrillation)        3. Atrial flutter, " unspecified type        4. Obstructive sleep apnea               Plan:       1.  Hypertension: Blood pressure has been well-controlled with the addition of valsartan.  Follow low-sodium diet less than 2000 mg daily.  Continue to monitor blood pressure and call with concerns.     2/34.  Paroxysmal Atrial Fibrillation/Flutter: Atrial fibrillation diagnosed in 2015.  Atrial flutter diagnosed in 2018.  Doing well on current dose of diltiazem. [id number] score of 4.  Continue rivaroxaban.     4.  Obstructive sleep apnea on CPAP.     5.  Cancel appointment with Dr. Nunez in May. Reschedule for 6 months from now.     As always, it has been a pleasure to participate in your patient's care. Thank you.         Sincerely,         ORACIO Plummer  [facility]      Dictated utilizing Dragon Dictation

## 2024-06-06 ENCOUNTER — TELEPHONE (OUTPATIENT)
Dept: CARDIOLOGY | Facility: CLINIC | Age: 79
End: 2024-06-06
Payer: MEDICARE

## 2024-06-06 NOTE — TELEPHONE ENCOUNTER
Please see scanned srugical clearance request under media tab from Dr. Caputo at [facility].      Pt is scheduled to have a bilateral upper blepharoplasty on 7/31/24.  They are requesting that pt hold her xarelto for 3 days prior to the surgery.    Please include the most recent EGK.    Clearance can be faxed to [phone]

## 2024-06-10 NOTE — TELEPHONE ENCOUNTER
She has been recommended to have bilateral upper blepharoplasty on 7/31/2024 by Dr. Caputo and perioperative cardiac risk assessment has been requested.  They are also requesting that patient hold her rivaroxaban 3 days prior to the surgery.    She has a known history of paroxysmal atrial fibrillation.  I spoke with Mrs. De Souza via telephone.  She has had a few brief episodes of atrial fibrillation over the last couple of weeks in the setting of a UTI.  She denies any recent symptoms.    She is considered at low risk of major adverse cardio cardiac events with moderate risk surgery.  She has been recommended to hold her rivaroxaban by the ophthalmologist, 3 days before the procedure.  When holding the rivaroxaban, she is at potential, nonmodifiable risk of stroke and she verbalizes understanding.  If she were to have any strokelike symptoms, she needs to call 911 immediately.

## 2024-06-11 NOTE — TELEPHONE ENCOUNTER
Date of Office Visit:  2024   Encounter Provider:  Kelvin Nunez MD  Place of Service:  [facility] CARDIOLOGY  Patient Name: Rebeca De Souza  :  1945      To Whom it May Concern:    Ms De Souza has atrial fibrillation and HTN. She does not have CHF or CAD. She is at low risk of major adverse CV events with surgery that involves general anesthesia. Standard preoperative guidelines recommend holding rivaroxaban for two doses prior to surgeries similar to this.    Please contact our office with any questions or concerns. As always, it has been a pleasure to participate in your patient's care.      Kelvin Nunez MD  [facility]

## 2024-06-11 NOTE — TELEPHONE ENCOUNTER
Apparently, they want except my preop recommendations for eye surgery.  Do you want to do this for me?

## 2024-06-11 NOTE — TELEPHONE ENCOUNTER
Dr. Caputo's office called about this surgical clearance. They said what they received they can not accept because of the following:    There needs to be a physical signature on it.  It has to be an MD that does the clearance. It can not be from an APRN or PA.     Bella Frias RN  Triage [facility]

## 2024-10-02 NOTE — PROGRESS NOTES
RM:________     PCP: Geri Hensley MD    : 1945  AGE: 79 y.o.  EST PATIENT     REASON FOR VISIT/  CC:        BP Readings from Last 3 Encounters:   24 145/64   24 172/88   10/30/23 130/76      Wt Readings from Last 3 Encounters:   24 74.5 kg (164 lb 3.2 oz)   24 74.8 kg (164 lb 12.8 oz)   10/30/23 74.1 kg (163 lb 6.4 oz)        WT: ____________ BP: __________L __________R HR______    CHEST PAIN: _____________    SOA: _____________PALPS: _______________     LIGHTHEADED: ___________FATIGUE: ________________ EDEMA __________    ALLERGIES:Shellfish-derived products, Shrimp (diagnostic), Esomeprazole, Oxybutynin, Mirabegron, Other, Penicillins, and Trimethoprim SMOKING HISTORY:  Social History     Tobacco Use    Smoking status: Never    Smokeless tobacco: Never    Tobacco comments:     caffeine use - very little coffee    Vaping Use    Vaping status: Never Used   Substance Use Topics    Alcohol use: Yes     Comment: rarely    Drug use: No     CAFFEINE USE_________________  ALCOHOL ______________________

## 2024-10-07 ENCOUNTER — OFFICE VISIT (OUTPATIENT)
Dept: CARDIOLOGY | Facility: CLINIC | Age: 79
End: 2024-10-07
Payer: MEDICARE

## 2024-10-07 VITALS
HEART RATE: 51 BPM | OXYGEN SATURATION: 97 % | SYSTOLIC BLOOD PRESSURE: 134 MMHG | HEIGHT: 66 IN | DIASTOLIC BLOOD PRESSURE: 82 MMHG | WEIGHT: 140.6 LBS | BODY MASS INDEX: 22.6 KG/M2

## 2024-10-07 DIAGNOSIS — I48.92 ATRIAL FLUTTER, UNSPECIFIED TYPE: ICD-10-CM

## 2024-10-07 DIAGNOSIS — I48.0 PAF (PAROXYSMAL ATRIAL FIBRILLATION): Primary | ICD-10-CM

## 2024-10-07 DIAGNOSIS — I10 PRIMARY HYPERTENSION: ICD-10-CM

## 2024-10-07 PROCEDURE — 3079F DIAST BP 80-89 MM HG: CPT | Performed by: INTERNAL MEDICINE

## 2024-10-07 PROCEDURE — 3075F SYST BP GE 130 - 139MM HG: CPT | Performed by: INTERNAL MEDICINE

## 2024-10-07 PROCEDURE — 1160F RVW MEDS BY RX/DR IN RCRD: CPT | Performed by: INTERNAL MEDICINE

## 2024-10-07 PROCEDURE — 99213 OFFICE O/P EST LOW 20 MIN: CPT | Performed by: INTERNAL MEDICINE

## 2024-10-07 PROCEDURE — 1159F MED LIST DOCD IN RCRD: CPT | Performed by: INTERNAL MEDICINE

## 2024-10-07 PROCEDURE — 93000 ELECTROCARDIOGRAM COMPLETE: CPT | Performed by: INTERNAL MEDICINE

## 2024-10-07 RX ORDER — FAMOTIDINE 20 MG
1 TABLET ORAL DAILY
COMMUNITY

## 2024-10-07 RX ORDER — DILTIAZEM HYDROCHLORIDE 360 MG/1
360 CAPSULE, EXTENDED RELEASE ORAL DAILY
COMMUNITY
Start: 2024-10-03

## 2024-10-07 NOTE — PROGRESS NOTES
Date of Office Visit: 10/07/24  Encounter Provider: Kelvin Nunez MD  Place of Service: [facility] CARDIOLOGY  Patient Name: Rebeca De Souza  :1945    Chief Complaint   Patient presents with    Follow-up     6 month follow-up   :     HPI:     Ms. De Souza is 79 y.o. and presents today in follow up. I have reviewed prior notes and there are no changes except for any new updates described below. I have also reviewed any information entered into the medical record by the patient or by ancillary staff.     In , she presented to [facility] with chest discomfort and had a normal coronary angiogram.  In , she presented with atrial fibrillation.  As there was no evidence of recurrence, she was eventually taken off of anticoagulation.  Ultimately, however, she was placed back on rivaroxaban.  In 2018, she was diagnosed with atrial flutter, and her diltiazem dose was increased.    She denies exertional chest pain or shortness of breath.  She is otherwise doing very well.    Past Medical History:   Diagnosis Date    Atrial flutter     Blood in stool     Fracture of radius     fall 16    Frequent UTI     on prophylactic poab    GERD (gastroesophageal reflux disease)     Hemorrhoid     Hypercalcemia     Hypertension     IBS (irritable bowel syndrome)     PAF (paroxysmal atrial fibrillation)     Parathyroid disorder     MD monitoring    Vertigo     Wrist pain     left        Past Surgical History:   Procedure Laterality Date    BLADDER SURGERY      tvt sling    CARDIAC CATHETERIZATION      COLONOSCOPY  10/23/2015    Non-thrombosed EH, Toruous colon, Diverticulosis, IH     COLONOSCOPY  2010    Non-thrombosed EH, Diverticulosis, Tortuous colon, IH     COLONOSCOPY  2007    Non thrombosed EH, Tortuous colon, One non bleeding polyp    COLONOSCOPY N/A 2017    Procedure: COLONOSCOPY TO CECUM AND TI WITH COLD BIOPSIES POLYPECTOMY;  Surgeon: Deepak MARTINES MD;   [facility];  Service:     COLONOSCOPY N/A 8/26/2022    Procedure: COLONOSCOPY to cecum and TI;  Surgeon: Deepak Amezcua MD;  Location: [facility] ENDOSCOPY;  Service: Gastroenterology;  Laterality: N/A;  PRE- hx of polyps, IBS, abdominal bloating  POST- diverticulosis, hemorrhoids    ENDOSCOPY N/A 5/2/2017    Procedure: ESOPHAGOGASTRODUODENOSCOPY  WITH COLD BIOPSY;  Surgeon: Deepak MARTINES MD;  Location: [facility] ENDOSCOPY;  Service:     ENDOSCOPY N/A 8/26/2022    Procedure: ESOPHAGOGASTRODUODENOSCOPY with biopsies;  Surgeon: Deepak Amezcua MD;  Location: [facility] ENDOSCOPY;  Service: Gastroenterology;  Laterality: N/A;  PRE- nausea, abdominal bloating  POST- esophagitis, diffuse gastritis    GALLBLADDER SURGERY      HYSTERECTOMY      removed tubes & rt ovary also    OOPHORECTOMY Left     ORIF WRIST FRACTURE Left 12/30/2016    Procedure:  LT DISTAL RADIUS OPEN REDUCTION INTERNAL FIXATION;  Surgeon: Shawn Dorsey MD;  Location: [facility] MAIN OR;  Service:     TONSILLECTOMY      TONSILLECTOMY      VARICOSE VEIN SURGERY      used wire       Social History     Socioeconomic History    Marital status:    Tobacco Use    Smoking status: Never     Passive exposure: Never    Smokeless tobacco: Never    Tobacco comments:     caffeine use - very little coffee    Vaping Use    Vaping status: Never Used   Substance and Sexual Activity    Alcohol use: Yes     Comment: rarely    Drug use: No    Sexual activity: Defer       Family History   Problem Relation Age of Onset    Hypertension Mother     Heart attack Father     Hypertension Father     Pancreatic cancer Brother     Diabetes Brother     Prostate cancer Brother     Diabetes Brother     Colon polyps Maternal Uncle     Colon cancer Maternal Uncle     Stroke Paternal Grandmother        Review of Systems   Hematologic/Lymphatic: Bruises/bleeds easily.   All other systems reviewed and are negative.      Allergies   Allergen Reactions     "Shellfish-Derived Products Swelling     Mouth swelled    Shrimp Anaphylaxis    Shrimp (Diagnostic) Swelling    Esomeprazole Other (See Comments)     Possibly caused renal insuff? But we are not certain    Oxybutynin Other (See Comments)     Acute renal insufficiency  Acute renal insufficiency      Mirabegron Palpitations     Palpitations  Palpitations      Other Itching and Rash     Cats ringworm     Penicillins Rash     childhood    Trimethoprim Rash     Rash  Rash  Rash  Rash           Current Outpatient Medications:     dilTIAZem (TIAZAC) 360 MG 24 hr capsule, Take 1 capsule by mouth Daily., Disp: , Rfl:     estradiol (VAGIFEM) 10 MCG tablet vaginal tablet, Insert 1 tablet into the vagina 1 (One) Time Per Week., Disp: , Rfl:     famotidine (Pepcid) 40 MG tablet, Take 1 tablet by mouth 2 (Two) Times a Day. (Patient taking differently: Take 1 tablet by mouth 2 (Two) Times a Day. Pt takes as needed), Disp: 60 tablet, Rfl: 3    lovastatin (MEVACOR) 10 MG tablet, Take 1 tablet by mouth., Disp: , Rfl:     metoprolol succinate XL (TOPROL-XL) 25 MG 24 hr tablet, Take 1 tablet by mouth Daily., Disp: 30 tablet, Rfl: 11    Multiple Vitamins-Minerals (PRESERVISION AREDS 2 PO), Take  by mouth Daily., Disp: , Rfl:     valsartan (DIOVAN) 80 MG tablet, Take 1 tablet by mouth Daily., Disp: 90 tablet, Rfl: 3    Vitamin B-2 (RIBOFLAVIN) 100 MG tablet tablet, Take  by mouth., Disp: , Rfl:     Vitamin D, Cholecalciferol, 25 MCG (1000 UT) capsule, Take 1 capsule by mouth Daily., Disp: , Rfl:     Xarelto 20 MG tablet, TAKE 1 TABLET DAILY, Disp: 90 tablet, Rfl: 3      Objective:     Vitals:    10/07/24 1108   BP: 134/82   BP Location: Left arm   Patient Position: Sitting   Cuff Size: Adult   Pulse: 51   SpO2: 97%   Weight: 63.8 kg (140 lb 9.6 oz)   Height: 167.6 cm (65.98\")     Body mass index is 22.7 kg/m².    Vitals reviewed.   Constitutional:       Appearance: Well-developed and not in distress.   Eyes:      Conjunctiva/sclera: " Conjunctivae normal.   HENT:      Head: Normocephalic.      Nose: Nose normal.    Mouth/Throat:      Pharynx: Oropharynx is clear.   Neck:      Vascular: No JVD. JVD normal.      Lymphadenopathy: No cervical adenopathy.   Pulmonary:      Effort: Pulmonary effort is normal.      Breath sounds: Normal breath sounds.   Cardiovascular:      Normal rate. Regular rhythm.      Murmurs: There is no murmur.   Pulses:     Intact distal pulses.   Edema:     Peripheral edema absent.   Abdominal:      Palpations: Abdomen is soft.      Tenderness: There is no abdominal tenderness.   Musculoskeletal: Normal range of motion.      Cervical back: Normal range of motion. Skin:     General: Skin is warm and dry.      Findings: No rash.   Neurological:      General: No focal deficit present.      Mental Status: Alert and oriented to person, place, and time.      Cranial Nerves: No cranial nerve deficit.   Psychiatric:         Behavior: Behavior normal.         Thought Content: Thought content normal.         Judgment: Judgment normal.         ECG 12 Lead    Date/Time: 10/7/2024 11:22 AM  Performed by: Kelvin Nunez MD    Authorized by: Kelvin Nunez MD  Comparison: compared with previous ECG   Similar to previous ECG  Rhythm: atrial fibrillation  Conduction: conduction normal  ST Segments: ST segments normal  T Waves: T waves normal  QRS axis: normal  Other: no other findings    Clinical impression: abnormal EKG        Assessment:       Diagnosis Plan   1. PAF (paroxysmal atrial fibrillation)  ECG 12 Lead      2. Atrial flutter, unspecified type        3. Primary hypertension          Plan:       She has a history of PAF and flutter, and has likely progressed to permanent atrial fibrillation. She is rate controlled and asymptomatic. She's on rivaroxaban, metoprolol, and diltiazem.  Since treating her sleep apnea, her palpitations have improved greatly! Her BP is well controlled.     Sincerely,       Kelvin Nunez MD

## 2024-11-21 ENCOUNTER — OFFICE VISIT (OUTPATIENT)
Dept: GASTROENTEROLOGY | Facility: CLINIC | Age: 79
End: 2024-11-21
Payer: MEDICARE

## 2024-11-21 VITALS
WEIGHT: 160 LBS | DIASTOLIC BLOOD PRESSURE: 76 MMHG | BODY MASS INDEX: 25.71 KG/M2 | OXYGEN SATURATION: 96 % | HEART RATE: 66 BPM | TEMPERATURE: 97.8 F | SYSTOLIC BLOOD PRESSURE: 118 MMHG | HEIGHT: 66 IN

## 2024-11-21 DIAGNOSIS — R11.0 NAUSEA: Primary | ICD-10-CM

## 2024-11-21 DIAGNOSIS — K62.5 RECTAL BLEEDING: ICD-10-CM

## 2024-11-21 PROCEDURE — 3074F SYST BP LT 130 MM HG: CPT | Performed by: INTERNAL MEDICINE

## 2024-11-21 PROCEDURE — 1160F RVW MEDS BY RX/DR IN RCRD: CPT | Performed by: INTERNAL MEDICINE

## 2024-11-21 PROCEDURE — 99213 OFFICE O/P EST LOW 20 MIN: CPT | Performed by: INTERNAL MEDICINE

## 2024-11-21 PROCEDURE — 1159F MED LIST DOCD IN RCRD: CPT | Performed by: INTERNAL MEDICINE

## 2024-11-21 PROCEDURE — 3078F DIAST BP <80 MM HG: CPT | Performed by: INTERNAL MEDICINE

## 2024-11-21 RX ORDER — NITROFURANTOIN 25; 75 MG/1; MG/1
100 CAPSULE ORAL
COMMUNITY
Start: 2024-10-24

## 2024-11-21 RX ORDER — HYDROCORTISONE ACETATE 25 MG/1
25 SUPPOSITORY RECTAL NIGHTLY PRN
Qty: 15 SUPPOSITORY | Refills: 3 | Status: SHIPPED | OUTPATIENT
Start: 2024-11-21

## 2024-11-21 NOTE — PROGRESS NOTES
Chief Complaint   Patient presents with    Nausea    Rectal Bleeding        Rebeca De Souza is a  79 y.o. female here for a follow up visit for nausea, rectal bleeding    HPI this 79-year-old female patient of Geri Hensley MD presents in follow-up since she had last undergone endoscopic evaluations in August 2022.  Upper and lower endoscopy was performed at that time.  She states for the past 6 months she has had intermittent rectal bleeding associated with straining.  She was noted to have internal hemorrhoids on that examination.  We talked about treatment for hemorrhoids to see if this affords symptomatic relief.  If her bleeding does not subside then I would consider more formal endoscopic evaluation.  We discussed use of sitz bath's and a doughnut to avoid pressure to the rectum but also to try to maintain routine bowel function.  I also discussed use of baby wipes with aloe to help circumvent trauma to that area.  Finally hydrocortisone suppository such as Anusol HC will be ordered to be taken at bedtime.  She is to administer to this problem and call with a progress report in 2 to 3 weeks time.    Past Medical History:   Diagnosis Date    Atrial flutter     Blood in stool     Fracture of radius     fall 12/27/16    Frequent UTI     on prophylactic poab    GERD (gastroesophageal reflux disease)     Hemorrhoid     Hypercalcemia     Hypertension     IBS (irritable bowel syndrome)     PAF (paroxysmal atrial fibrillation)     Parathyroid disorder     MD monitoring    Vertigo     Wrist pain     left        Current Outpatient Medications   Medication Sig Dispense Refill    dilTIAZem (TIAZAC) 360 MG 24 hr capsule Take 1 capsule by mouth Daily.      estradiol (VAGIFEM) 10 MCG tablet vaginal tablet Insert 1 tablet into the vagina 1 (One) Time Per Week.      famotidine (Pepcid) 40 MG tablet Take 1 tablet by mouth 2 (Two) Times a Day. (Patient taking differently: Take 1 tablet by mouth 2 (Two) Times a Day. Pt takes  as needed) 60 tablet 3    lovastatin (MEVACOR) 10 MG tablet Take 1 tablet by mouth.      metoprolol succinate XL (TOPROL-XL) 25 MG 24 hr tablet Take 1 tablet by mouth Daily. 30 tablet 11    Multiple Vitamins-Minerals (PRESERVISION AREDS 2 PO) Take  by mouth Daily.      valsartan (DIOVAN) 80 MG tablet Take 1 tablet by mouth Daily. 90 tablet 3    Vitamin D, Cholecalciferol, 25 MCG (1000 UT) capsule Take 1 capsule by mouth Daily.      Xarelto 20 MG tablet TAKE 1 TABLET DAILY 90 tablet 3    nitrofurantoin, macrocrystal-monohydrate, (MACROBID) 100 MG capsule Take 1 capsule by mouth. (Patient not taking: Reported on 11/21/2024)      Vitamin B-2 (RIBOFLAVIN) 100 MG tablet tablet Take  by mouth. (Patient not taking: Reported on 11/21/2024)       No current facility-administered medications for this visit.       PRN Meds:.    Allergies   Allergen Reactions    Shellfish-Derived Products Swelling     Mouth swelled    Shrimp Anaphylaxis    Shrimp (Diagnostic) Swelling    Esomeprazole Other (See Comments)     Possibly caused renal insuff? But we are not certain    Oxybutynin Other (See Comments)     Acute renal insufficiency  Acute renal insufficiency      Mirabegron Palpitations     Palpitations  Palpitations      Other Itching and Rash     Cats ringworm     Penicillins Rash     childhood    Trimethoprim Rash     Rash  Rash  Rash  Rash         Social History     Socioeconomic History    Marital status:    Tobacco Use    Smoking status: Never     Passive exposure: Never    Smokeless tobacco: Never    Tobacco comments:     caffeine use - very little coffee    Vaping Use    Vaping status: Never Used   Substance and Sexual Activity    Alcohol use: Yes     Comment: rarely    Drug use: No    Sexual activity: Defer       Family History   Problem Relation Age of Onset    Hypertension Mother     Heart attack Father     Hypertension Father     Pancreatic cancer Brother     Diabetes Brother     Prostate cancer Brother     Diabetes  Brother     Colon polyps Maternal Uncle     Colon cancer Maternal Uncle     Stroke Paternal Grandmother        Review of Systems   Constitutional:  Negative for activity change, appetite change, fatigue and unexpected weight change.   HENT:  Negative for congestion, facial swelling, sore throat, trouble swallowing and voice change.    Eyes:  Negative for photophobia and visual disturbance.   Respiratory:  Negative for cough and choking.    Cardiovascular:  Negative for chest pain.   Gastrointestinal:  Positive for anal bleeding and constipation. Negative for abdominal distention, abdominal pain, blood in stool, diarrhea, nausea, rectal pain and vomiting.   Endocrine: Negative for polyphagia.   Musculoskeletal:  Negative for arthralgias, gait problem and joint swelling.   Skin:  Negative for color change, pallor and rash.   Allergic/Immunologic: Negative for food allergies.   Neurological:  Negative for speech difficulty and headaches.   Hematological:  Does not bruise/bleed easily.   Psychiatric/Behavioral:  Negative for agitation, confusion and sleep disturbance.        Vitals:    11/21/24 1309   BP: 118/76   Pulse: 66   Temp: 97.8 °F (36.6 °C)   SpO2: 96%       Physical Exam  Constitutional:       Appearance: She is well-developed.   HENT:      Head: Normocephalic.   Eyes:      Conjunctiva/sclera: Conjunctivae normal.   Cardiovascular:      Rate and Rhythm: Normal rate and regular rhythm.   Pulmonary:      Breath sounds: Normal breath sounds.   Abdominal:      General: Bowel sounds are normal.      Palpations: Abdomen is soft.   Musculoskeletal:         General: Normal range of motion.      Cervical back: Normal range of motion.   Skin:     General: Skin is warm and dry.   Neurological:      Mental Status: She is alert and oriented to person, place, and time.   Psychiatric:         Behavior: Behavior normal.         ASSESSMENT   #1 rectal bleeding: Consistent with local trauma associated with hemorrhoids  #2  constipation  #3 history of IBS      PLAN  Initiate fiber in the form of Citrucel  Anusol HC suppository at bedtime  Baby wipes with aloe  Call with progress report in 3 weeks, if symptoms persist consider more formal endoscopic evaluation      ICD-10-CM ICD-9-CM   1. Nausea  R11.0 787.02   2. Rectal bleeding  K62.5 569.3

## 2024-12-17 ENCOUNTER — TELEPHONE (OUTPATIENT)
Age: 79
End: 2024-12-17
Payer: MEDICARE

## 2024-12-17 NOTE — TELEPHONE ENCOUNTER
CARDIOLOGY    Patient Name: Rebeca De Souza  :1945  Age: 79 y.o.    24    To whom it may concern:    Rebeca De Souza was referred to my office for cardiovascular risk assessment exam for upcoming surgery.    She has persistent atrial fibrillation and HTN. She does not have CAD or CHF.    From a cardiovascular standpoint, the patient is at the following risk of major cardiovascular event in the barry-operative setting:  [x] Low         [] Modifiable  [] Low-Moderate       [] Non-Modifiable   [] Moderate  [] Moderare - high  [] High    Cardiac Testing:  [] Needs further cardiac testing  [x] Does not need further cardiac testing    Anticoagulant Status:  [] No anticoagulants    [] The patient is on  [] ASA; hold for ___ days.  [] Coumadin; hold for ___ days.  [] Plavix; hold for ___ days.  [] Eliquis; hold for ___ days.  [] Brilinta; hold for ___ days.  [x] Xarelto; hold for _2__ days.  [] Effient; hold for ___ days.      If there are any problems during surgery, please do not hesitate to contact my office.    Thank you for allowing me to participate in this patient's care.    Sincerely,         Kelvin Nunez MD  [facility]   [facility]  [address] [place] [address]  Office: [phone]

## 2024-12-17 NOTE — TELEPHONE ENCOUNTER
Pt is scheduled for VAGINAL UTEROSACRAL LIGAMENT SUSPENSION, ANTERIOR REPAIR, POSTERIOR REPAIR, CYSTOSCOPY under General ANES with Dr. Cristina Delacruz.      They are requesting pre op risk assessment and approval to hold Xarelto x 3 days prior to procedure.

## 2024-12-19 RX ORDER — RIVAROXABAN 20 MG/1
20 TABLET, FILM COATED ORAL DAILY
Qty: 90 TABLET | Refills: 1 | Status: SHIPPED | OUTPATIENT
Start: 2024-12-19

## 2024-12-30 RX ORDER — METOPROLOL TARTRATE 25 MG/1
TABLET, FILM COATED ORAL
Qty: 100 TABLET | Refills: 3 | OUTPATIENT
Start: 2024-12-30

## 2024-12-30 NOTE — TELEPHONE ENCOUNTER
Called the patient and she stated she is taking the metoprolol succinate XL and not the metoprolol Tartrate

## 2025-01-03 RX ORDER — METOPROLOL TARTRATE 25 MG/1
TABLET, FILM COATED ORAL
Qty: 100 TABLET | Refills: 3 | OUTPATIENT
Start: 2025-01-03

## 2025-01-08 RX ORDER — DILTIAZEM HYDROCHLORIDE 360 MG/1
360 CAPSULE, EXTENDED RELEASE ORAL DAILY
Qty: 90 CAPSULE | Refills: 1 | Status: SHIPPED | OUTPATIENT
Start: 2025-01-08

## 2025-04-29 RX ORDER — VALSARTAN 80 MG/1
80 TABLET ORAL DAILY
Qty: 90 TABLET | Refills: 3 | Status: SHIPPED | OUTPATIENT
Start: 2025-04-29

## 2025-04-29 NOTE — TELEPHONE ENCOUNTER
Last OV  10/7/24   (ESSENCEK)  Next OV  10/9/25   (TK)  Labs 11/25/24      Failed Protocol Checklist

## 2025-07-03 RX ORDER — RIVAROXABAN 20 MG/1
20 TABLET, FILM COATED ORAL DAILY
Qty: 90 TABLET | Refills: 1 | Status: SHIPPED | OUTPATIENT
Start: 2025-07-03

## 2025-07-14 RX ORDER — METOPROLOL TARTRATE 25 MG/1
25 TABLET, FILM COATED ORAL DAILY
Qty: 90 TABLET | Refills: 0 | Status: SHIPPED | OUTPATIENT
Start: 2025-07-14

## 2025-07-28 RX ORDER — DILTIAZEM HYDROCHLORIDE 360 MG/1
360 CAPSULE, EXTENDED RELEASE ORAL DAILY
Qty: 90 CAPSULE | Refills: 0 | Status: SHIPPED | OUTPATIENT
Start: 2025-07-28

## (undated) DEVICE — FRCP BX RADJAW4 NDL 2.8 240CM LG OG BX40

## (undated) DEVICE — TUBING, SUCTION, 1/4" X 10', STRAIGHT: Brand: MEDLINE

## (undated) DEVICE — LN SMPL CO2 SHTRM SD STREAM W/M LUER

## (undated) DEVICE — THE TORRENT IRRIGATION SCOPE CONNECTOR IS USED WITH THE TORRENT IRRIGATION TUBING TO PROVIDE IRRIGATION FLUIDS SUCH AS STERILE WATER DURING GASTROINTESTINAL ENDOSCOPIC PROCEDURES WHEN USED IN CONJUNCTION WITH AN IRRIGATION PUMP (OR ELECTROSURGICAL UNIT).: Brand: TORRENT

## (undated) DEVICE — KT ORCA ORCAPOD DISP STRL

## (undated) DEVICE — Device: Brand: DEFENDO AIR/WATER/SUCTION AND BIOPSY VALVE

## (undated) DEVICE — CANN NASL CO2 TRULINK W/O2 A/

## (undated) DEVICE — ADAPT CLN BIOGUARD AIR/H2O DISP

## (undated) DEVICE — BITEBLOCK OMNI BLOC

## (undated) DEVICE — SENSR O2 OXIMAX FNGR A/ 18IN NONSTR

## (undated) DEVICE — MSK ENDO PORT O2 POM ELITE CURAPLEX A/